# Patient Record
Sex: FEMALE | Race: BLACK OR AFRICAN AMERICAN | NOT HISPANIC OR LATINO | Employment: UNEMPLOYED | URBAN - NONMETROPOLITAN AREA
[De-identification: names, ages, dates, MRNs, and addresses within clinical notes are randomized per-mention and may not be internally consistent; named-entity substitution may affect disease eponyms.]

---

## 2018-01-04 ENCOUNTER — HOSPITAL ENCOUNTER (EMERGENCY)
Facility: HOSPITAL | Age: 28
Discharge: HOME | End: 2018-01-04
Attending: EMERGENCY MEDICINE | Admitting: EMERGENCY MEDICINE

## 2018-01-04 ENCOUNTER — APPOINTMENT (OUTPATIENT)
Dept: GENERAL RADIOLOGY | Facility: HOSPITAL | Age: 28
End: 2018-01-04

## 2018-01-04 VITALS
HEART RATE: 78 BPM | DIASTOLIC BLOOD PRESSURE: 64 MMHG | HEIGHT: 66 IN | RESPIRATION RATE: 18 BRPM | TEMPERATURE: 97 F | WEIGHT: 150 LBS | OXYGEN SATURATION: 98 % | SYSTOLIC BLOOD PRESSURE: 97 MMHG | BODY MASS INDEX: 24.11 KG/M2

## 2018-01-04 DIAGNOSIS — F41.0 ANXIETY ATTACK: Primary | ICD-10-CM

## 2018-01-04 LAB
ALBUMIN SERPL-MCNC: 4.7 G/DL (ref 3.4–4.8)
ALBUMIN/GLOB SERPL: 1.3 G/DL (ref 1.1–1.8)
ALP SERPL-CCNC: 84 U/L (ref 38–126)
ALT SERPL W P-5'-P-CCNC: 35 U/L (ref 9–52)
AMPHET+METHAMPHET UR QL: NEGATIVE
ANION GAP SERPL CALCULATED.3IONS-SCNC: 14 MMOL/L (ref 5–15)
AST SERPL-CCNC: 31 U/L (ref 14–36)
B-HCG UR QL: NEGATIVE
BARBITURATES UR QL SCN: NEGATIVE
BASOPHILS # BLD AUTO: 0.03 10*3/MM3 (ref 0–0.2)
BASOPHILS NFR BLD AUTO: 0.4 % (ref 0–2)
BENZODIAZ UR QL SCN: NEGATIVE
BILIRUB SERPL-MCNC: 0.7 MG/DL (ref 0.2–1.3)
BILIRUB UR QL STRIP: NEGATIVE
BUN BLD-MCNC: 9 MG/DL (ref 7–21)
BUN/CREAT SERPL: 16.1 (ref 7–25)
CALCIUM SPEC-SCNC: 9 MG/DL (ref 8.4–10.2)
CANNABINOIDS SERPL QL: NEGATIVE
CHLORIDE SERPL-SCNC: 99 MMOL/L (ref 95–110)
CLARITY UR: CLEAR
CO2 SERPL-SCNC: 24 MMOL/L (ref 22–31)
COCAINE UR QL: NEGATIVE
COLOR UR: YELLOW
CREAT BLD-MCNC: 0.56 MG/DL (ref 0.5–1)
D-DIMER, QUANTITATIVE (MAD,POW, STR): 315 NG/ML (FEU) (ref 0–470)
DEPRECATED RDW RBC AUTO: 38.3 FL (ref 36.4–46.3)
EOSINOPHIL # BLD AUTO: 0.2 10*3/MM3 (ref 0–0.7)
EOSINOPHIL NFR BLD AUTO: 2.8 % (ref 0–7)
ERYTHROCYTE [DISTWIDTH] IN BLOOD BY AUTOMATED COUNT: 13.1 % (ref 11.5–14.5)
GFR SERPL CREATININE-BSD FRML MDRD: 156 ML/MIN/1.73 (ref 71–165)
GLOBULIN UR ELPH-MCNC: 3.6 GM/DL (ref 2.3–3.5)
GLUCOSE BLD-MCNC: 91 MG/DL (ref 60–100)
GLUCOSE UR STRIP-MCNC: NEGATIVE MG/DL
HCT VFR BLD AUTO: 37.9 % (ref 35–45)
HGB BLD-MCNC: 12.9 G/DL (ref 12–15.5)
HGB UR QL STRIP.AUTO: NEGATIVE
IMM GRANULOCYTES # BLD: 0.01 10*3/MM3 (ref 0–0.02)
IMM GRANULOCYTES NFR BLD: 0.1 % (ref 0–0.5)
KETONES UR QL STRIP: NEGATIVE
LEUKOCYTE ESTERASE UR QL STRIP.AUTO: NEGATIVE
LYMPHOCYTES # BLD AUTO: 2.68 10*3/MM3 (ref 0.6–4.2)
LYMPHOCYTES NFR BLD AUTO: 37.7 % (ref 10–50)
MCH RBC QN AUTO: 27.2 PG (ref 26.5–34)
MCHC RBC AUTO-ENTMCNC: 34 G/DL (ref 31.4–36)
MCV RBC AUTO: 79.8 FL (ref 80–98)
METHADONE UR QL SCN: NEGATIVE
MONOCYTES # BLD AUTO: 0.34 10*3/MM3 (ref 0–0.9)
MONOCYTES NFR BLD AUTO: 4.8 % (ref 0–12)
NEUTROPHILS # BLD AUTO: 3.84 10*3/MM3 (ref 2–8.6)
NEUTROPHILS NFR BLD AUTO: 54.2 % (ref 37–80)
NITRITE UR QL STRIP: NEGATIVE
NRBC BLD MANUAL-RTO: 0 /100 WBC (ref 0–0)
OPIATES UR QL: NEGATIVE
OXYCODONE UR QL SCN: NEGATIVE
PH UR STRIP.AUTO: 7 [PH] (ref 5–9)
PLATELET # BLD AUTO: 426 10*3/MM3 (ref 150–450)
PMV BLD AUTO: 8.1 FL (ref 8–12)
POTASSIUM BLD-SCNC: 3.4 MMOL/L (ref 3.5–5.1)
PROT SERPL-MCNC: 8.3 G/DL (ref 6.3–8.6)
PROT UR QL STRIP: NEGATIVE
RBC # BLD AUTO: 4.75 10*6/MM3 (ref 3.77–5.16)
SODIUM BLD-SCNC: 137 MMOL/L (ref 137–145)
SP GR UR STRIP: 1.01 (ref 1–1.03)
TROPONIN I SERPL-MCNC: <0.012 NG/ML
TSH SERPL DL<=0.05 MIU/L-ACNC: 1.69 MIU/ML (ref 0.46–4.68)
UROBILINOGEN UR QL STRIP: NORMAL
WBC NRBC COR # BLD: 7.1 10*3/MM3 (ref 3.2–9.8)

## 2018-01-04 PROCEDURE — 80050 GENERAL HEALTH PANEL: CPT | Performed by: PHYSICIAN ASSISTANT

## 2018-01-04 PROCEDURE — 80307 DRUG TEST PRSMV CHEM ANLYZR: CPT | Performed by: PHYSICIAN ASSISTANT

## 2018-01-04 PROCEDURE — 93010 ELECTROCARDIOGRAM REPORT: CPT | Performed by: INTERNAL MEDICINE

## 2018-01-04 PROCEDURE — 84484 ASSAY OF TROPONIN QUANT: CPT | Performed by: PHYSICIAN ASSISTANT

## 2018-01-04 PROCEDURE — 93005 ELECTROCARDIOGRAM TRACING: CPT

## 2018-01-04 PROCEDURE — 85379 FIBRIN DEGRADATION QUANT: CPT | Performed by: PHYSICIAN ASSISTANT

## 2018-01-04 PROCEDURE — 96360 HYDRATION IV INFUSION INIT: CPT

## 2018-01-04 PROCEDURE — 99283 EMERGENCY DEPT VISIT LOW MDM: CPT

## 2018-01-04 PROCEDURE — 81025 URINE PREGNANCY TEST: CPT | Performed by: PHYSICIAN ASSISTANT

## 2018-01-04 PROCEDURE — 96361 HYDRATE IV INFUSION ADD-ON: CPT

## 2018-01-04 PROCEDURE — 93005 ELECTROCARDIOGRAM TRACING: CPT | Performed by: PHYSICIAN ASSISTANT

## 2018-01-04 PROCEDURE — 81003 URINALYSIS AUTO W/O SCOPE: CPT | Performed by: PHYSICIAN ASSISTANT

## 2018-01-04 PROCEDURE — 71045 X-RAY EXAM CHEST 1 VIEW: CPT

## 2018-01-04 RX ORDER — SODIUM CHLORIDE 0.9 % (FLUSH) 0.9 %
10 SYRINGE (ML) INJECTION AS NEEDED
Status: DISCONTINUED | OUTPATIENT
Start: 2018-01-04 | End: 2018-01-04 | Stop reason: HOSPADM

## 2018-01-04 RX ORDER — ASPIRIN 81 MG/1
324 TABLET, CHEWABLE ORAL ONCE
Status: COMPLETED | OUTPATIENT
Start: 2018-01-04 | End: 2018-01-04

## 2018-01-04 RX ORDER — BUSPIRONE HYDROCHLORIDE 15 MG/1
15 TABLET ORAL 3 TIMES DAILY
Qty: 90 TABLET | Refills: 0 | Status: SHIPPED | OUTPATIENT
Start: 2018-01-04 | End: 2018-02-03

## 2018-01-04 RX ORDER — ALPRAZOLAM 0.5 MG/1
0.5 TABLET ORAL ONCE
Status: COMPLETED | OUTPATIENT
Start: 2018-01-04 | End: 2018-01-04

## 2018-01-04 RX ORDER — SODIUM CHLORIDE 9 MG/ML
1000 INJECTION, SOLUTION INTRAVENOUS ONCE
Status: COMPLETED | OUTPATIENT
Start: 2018-01-04 | End: 2018-01-04

## 2018-01-04 RX ADMIN — SODIUM CHLORIDE 1000 ML: 900 INJECTION, SOLUTION INTRAVENOUS at 12:08

## 2018-01-04 RX ADMIN — ALPRAZOLAM 0.5 MG: 0.5 TABLET ORAL at 12:07

## 2018-01-04 RX ADMIN — Medication 10 ML: at 12:01

## 2018-01-04 RX ADMIN — ASPIRIN 81 MG 324 MG: 81 TABLET ORAL at 12:07

## 2018-01-04 NOTE — ED PROVIDER NOTES
Subjective   HPI Comments: Patient states that she had an episode when she felt overwhelmed with chest pain and shortness of breath. Says that her mouth got really dry and her hands were tingly. She started pacing outside in the yard until someone got to her house. Family member states that she had another similar episode in the waiting room here in the ED. Denies taking any medications, smoking, drug use, or alcohol use. Does not take any medicines everyday.     Patient is a 27 y.o. female presenting with anxiety.   History provided by:  Patient   used: No    Anxiety   Presents for initial visit. Onset was in the past 7 days. The problem has been unchanged. Symptoms include chest pain, dry mouth, excessive worry, irritability, nausea, nervous/anxious behavior, panic and shortness of breath. Patient reports no compulsions, confusion, decreased concentration, depressed mood, dizziness, feeling of choking, hyperventilation, impotence, insomnia, malaise, muscle tension, obsessions, palpitations, restlessness or suicidal ideas. Symptoms occur occasionally. The most recent episode lasted 2 minutes. The severity of symptoms is mild. Nothing aggravates the symptoms. The patient sleeps 6 hours per night. The quality of sleep is fair. Nighttime awakenings: occasional.     There are no known risk factors. There is no history of anemia, anxiety/panic attacks, arrhythmia, asthma, bipolar disorder, CAD, CHF, chronic lung disease, depression, fibromyalgia, hyperthyroidism or suicide attempts. Past treatments include nothing.       Review of Systems   Constitutional: Positive for irritability. Negative for activity change, appetite change, chills, diaphoresis, fatigue, fever and unexpected weight change.   HENT: Negative for congestion, dental problem, drooling, ear discharge, ear pain, facial swelling, hearing loss, mouth sores, nosebleeds, postnasal drip, rhinorrhea, sinus pain, sinus pressure, sneezing, sore  throat, tinnitus, trouble swallowing and voice change.    Eyes: Negative.    Respiratory: Positive for shortness of breath. Negative for apnea, cough, choking, chest tightness, wheezing and stridor.    Cardiovascular: Positive for chest pain. Negative for palpitations and leg swelling.   Gastrointestinal: Positive for nausea. Negative for abdominal distention, abdominal pain, anal bleeding, blood in stool, constipation and diarrhea.   Endocrine: Negative.    Genitourinary: Negative.  Negative for impotence.   Musculoskeletal: Negative.    Skin: Negative.    Allergic/Immunologic: Negative.    Neurological: Negative for dizziness.   Hematological: Negative.    Psychiatric/Behavioral: Negative for agitation, behavioral problems, confusion, decreased concentration, dysphoric mood, hallucinations, self-injury, sleep disturbance and suicidal ideas. The patient is nervous/anxious. The patient does not have insomnia and is not hyperactive.        History reviewed. No pertinent past medical history.    No Known Allergies    History reviewed. No pertinent surgical history.    History reviewed. No pertinent family history.    Social History     Social History   • Marital status:      Spouse name: N/A   • Number of children: N/A   • Years of education: N/A     Social History Main Topics   • Smoking status: Never Smoker   • Smokeless tobacco: None   • Alcohol use No   • Drug use: No   • Sexual activity: Defer     Other Topics Concern   • None     Social History Narrative   • None           Objective   Physical Exam   Constitutional: She is oriented to person, place, and time. She appears well-developed and well-nourished. No distress.   HENT:   Head: Normocephalic and atraumatic.   Eyes: Conjunctivae and EOM are normal. Pupils are equal, round, and reactive to light. Right eye exhibits no discharge. Left eye exhibits no discharge. No scleral icterus.   Neck: Normal range of motion. Neck supple. No JVD present. No tracheal  deviation present. No thyromegaly present.   Cardiovascular: Normal rate, regular rhythm, normal heart sounds and intact distal pulses.  Exam reveals no gallop and no friction rub.    No murmur heard.  Pulmonary/Chest: Effort normal and breath sounds normal. No stridor. No respiratory distress. She has no wheezes. She has no rales. She exhibits no tenderness.   Abdominal: Soft. Bowel sounds are normal. She exhibits no distension and no mass. There is no tenderness. There is no rebound and no guarding. No hernia.   Musculoskeletal: Normal range of motion. She exhibits no edema, tenderness or deformity.   Lymphadenopathy:     She has no cervical adenopathy.   Neurological: She is alert and oriented to person, place, and time. She has normal reflexes.   Skin: Skin is warm and dry. No rash noted. She is not diaphoretic. No erythema. No pallor.   Psychiatric: Her speech is normal and behavior is normal. Judgment and thought content normal. Her mood appears anxious. Her affect is not angry, not blunt, not labile and not inappropriate. She is not actively hallucinating. Cognition and memory are normal. She does not exhibit a depressed mood. She is attentive.   Nursing note and vitals reviewed.      ECG 12 Lead    Date/Time: 1/4/2018 4:39 PM  Performed by: BALBIR VARGAS  Authorized by: BALBIR VARGAS   Interpreted by physician  Comparison: compared with previous ECG   Rhythm: sinus rhythm  Rate: normal  Clinical impression: normal ECG               ED Course  ED Course      Labs Reviewed   COMPREHENSIVE METABOLIC PANEL - Abnormal; Notable for the following:        Result Value    Potassium 3.4 (*)     Globulin 3.6 (*)     All other components within normal limits   CBC WITH AUTO DIFFERENTIAL - Abnormal; Notable for the following:     MCV 79.8 (*)     All other components within normal limits   D-DIMER, QUANTITATIVE - Normal    Narrative:     Dimer values <500 ng/ml FEU are FDA approved as aid in diagnosis of deep venous  thrombosis and pulmonary embolism.  This test should not be used in an exclusion strategy with pretest probability alone.    A recent guideline regarding diagnosis for pulmonary thomboembolism recommends an adjusted exclusion criterion of age x 10 ng/ml FEU for patients >50 years of age ([employer] Intern Med 2015; 163: 701-711).   URINALYSIS W/ CULTURE IF INDICATED - Normal    Narrative:     Urine microscopic not indicated.   PREGNANCY, URINE - Normal   URINE DRUG SCREEN - Normal    Narrative:     Negative Thresholds For Drugs Screened in Urine:     Amphetamines          500 ng/ml  Barbiturates          200 ng/ml  Benzodiazepines       200 ng/ml  Cocaine               150 ng/ml  Methadone             300 ng/mL  Opiates               300 ng/mL  Oxycodone             100 ng/mL  THC                   20 ng/mL    The normal value for all drugs tested is negative. This report includes final unconfirmed screening results.  A positive result by this assay can be, at your request, sent to the Reference Lab for confirmation by gas chromatography. Unconfirmed results must not be used for non-medical purposes, such as employment or legal testing. Clinical consideration should be applied to any drug of abuse test result, particularly when unconfirmed results are used.   TROPONIN (IN-HOUSE) - Normal   TSH - Normal   CBC AND DIFFERENTIAL    Narrative:     The following orders were created for panel order CBC & Differential.  Procedure                               Abnormality         Status                     ---------                               -----------         ------                     CBC Auto Differential[[id number]]        Abnormal            Final result                 Please view results for these tests on the individual orders.     Xr Chest 1 View    Result Date: 1/4/2018  Narrative: PORTABLE CHEST HISTORY: Chest pain and shortness of breath. Portable AP upright film of the chest was obtained at 1:24 PM. Abdomen and  pelvis shielded. COMPARISON: None EKG leads. The lungs are clear of an acute process. The heart is not enlarged. The pulmonary vasculature is not increased. No pleural effusion. No pneumothorax. No acute osseous abnormality.     Impression: CONCLUSION: Normal portable chest [id number] Electronically signed by:  Maximiliano Guzman MD  1/4/2018 1:12 PM CST Workstation: "[employer] App"      Patient's symptoms resolved with anxiety medications. States that she feels better and wants to be sent home.           MDM  Number of Diagnoses or Management Options  Anxiety attack:       Final diagnoses:   Anxiety attack            RACHEL Cruz  01/04/18 1640

## 2018-01-04 NOTE — ED NOTES
Pt verbalized understanding of d/c instructions, new medication side effects, and importance of follow up with PCP.  Pt denies pain at this time.     Lisa Uriarte RN  01/04/18 7961

## 2018-05-29 PROCEDURE — 87255 GENET VIRUS ISOLATE HSV: CPT | Performed by: PHYSICIAN ASSISTANT

## 2018-07-06 ENCOUNTER — APPOINTMENT (OUTPATIENT)
Dept: LAB | Facility: HOSPITAL | Age: 28
End: 2018-07-06

## 2018-07-06 ENCOUNTER — INITIAL PRENATAL (OUTPATIENT)
Dept: OBSTETRICS AND GYNECOLOGY | Facility: CLINIC | Age: 28
End: 2018-07-06

## 2018-07-06 VITALS — WEIGHT: 174 LBS | BODY MASS INDEX: 29.87 KG/M2 | DIASTOLIC BLOOD PRESSURE: 78 MMHG | SYSTOLIC BLOOD PRESSURE: 113 MMHG

## 2018-07-06 DIAGNOSIS — O21.9 NAUSEA AND VOMITING DURING PREGNANCY: ICD-10-CM

## 2018-07-06 DIAGNOSIS — Z3A.00 WEEKS OF GESTATION OF PREGNANCY NOT SPECIFIED: ICD-10-CM

## 2018-07-06 DIAGNOSIS — Z34.80 PRENATAL CARE OF MULTIGRAVIDA, ANTEPARTUM: Primary | ICD-10-CM

## 2018-07-06 DIAGNOSIS — Z01.419 ENCOUNTER FOR WELL WOMAN EXAM WITH ROUTINE GYNECOLOGICAL EXAM: ICD-10-CM

## 2018-07-06 DIAGNOSIS — Z32.00 POSSIBLE PREGNANCY, NOT YET CONFIRMED: ICD-10-CM

## 2018-07-06 DIAGNOSIS — Z32.01 PREGNANCY EXAMINATION OR TEST, POSITIVE RESULT: ICD-10-CM

## 2018-07-06 LAB
ABO GROUP BLD: NORMAL
AMPHET+METHAMPHET UR QL: NEGATIVE
B-HCG UR QL: POSITIVE
BARBITURATES UR QL SCN: NEGATIVE
BASOPHILS # BLD AUTO: 0.01 10*3/MM3 (ref 0–0.2)
BASOPHILS NFR BLD AUTO: 0.1 % (ref 0–2)
BENZODIAZ UR QL SCN: NEGATIVE
BILIRUB UR QL STRIP: NEGATIVE
BLD GP AB SCN SERPL QL: NEGATIVE
CANNABINOIDS SERPL QL: NEGATIVE
CLARITY UR: ABNORMAL
COCAINE UR QL: NEGATIVE
COLOR UR: YELLOW
DEPRECATED RDW RBC AUTO: 39.6 FL (ref 36.4–46.3)
EOSINOPHIL # BLD AUTO: 0.25 10*3/MM3 (ref 0–0.7)
EOSINOPHIL NFR BLD AUTO: 3.1 % (ref 0–7)
ERYTHROCYTE [DISTWIDTH] IN BLOOD BY AUTOMATED COUNT: 14 % (ref 11.5–14.5)
GLUCOSE UR STRIP-MCNC: NEGATIVE MG/DL
HBV SURFACE AG SERPL QL IA: NEGATIVE
HCT VFR BLD AUTO: 33.9 % (ref 35–45)
HCV AB SER DONR QL: NEGATIVE
HGB BLD-MCNC: 11.8 G/DL (ref 12–15.5)
HGB UR QL STRIP.AUTO: NEGATIVE
HIV1+2 AB SER QL: NEGATIVE
IMM GRANULOCYTES # BLD: 0.02 10*3/MM3 (ref 0–0.02)
IMM GRANULOCYTES NFR BLD: 0.2 % (ref 0–0.5)
INTERNAL NEGATIVE CONTROL: NEGATIVE
INTERNAL POSITIVE CONTROL: POSITIVE
KETONES UR QL STRIP: NEGATIVE
LEUKOCYTE ESTERASE UR QL STRIP.AUTO: NEGATIVE
LYMPHOCYTES # BLD AUTO: 1.9 10*3/MM3 (ref 0.6–4.2)
LYMPHOCYTES NFR BLD AUTO: 23.7 % (ref 10–50)
Lab: ABNORMAL
Lab: NORMAL
MCH RBC QN AUTO: 27.4 PG (ref 26.5–34)
MCHC RBC AUTO-ENTMCNC: 34.8 G/DL (ref 31.4–36)
MCV RBC AUTO: 78.8 FL (ref 80–98)
METHADONE UR QL SCN: NEGATIVE
MONOCYTES # BLD AUTO: 0.45 10*3/MM3 (ref 0–0.9)
MONOCYTES NFR BLD AUTO: 5.6 % (ref 0–12)
NEUTROPHILS # BLD AUTO: 5.38 10*3/MM3 (ref 2–8.6)
NEUTROPHILS NFR BLD AUTO: 67.3 % (ref 37–80)
NITRITE UR QL STRIP: NEGATIVE
OPIATES UR QL: NEGATIVE
OXYCODONE UR QL SCN: NEGATIVE
PH UR STRIP.AUTO: 7 [PH] (ref 5–9)
PLATELET # BLD AUTO: 393 10*3/MM3 (ref 150–450)
PMV BLD AUTO: 8.1 FL (ref 8–12)
PROT UR QL STRIP: NEGATIVE
RBC # BLD AUTO: 4.3 10*6/MM3 (ref 3.77–5.16)
RH BLD: POSITIVE
RUBV IGG SER QL: ABNORMAL
RUBV IGG SER-ACNC: 0 IU/ML (ref 0–9.9)
SP GR UR STRIP: 1.02 (ref 1–1.03)
UROBILINOGEN UR QL STRIP: ABNORMAL
WBC NRBC COR # BLD: 8.01 10*3/MM3 (ref 3.2–9.8)

## 2018-07-06 PROCEDURE — 87491 CHLMYD TRACH DNA AMP PROBE: CPT | Performed by: ADVANCED PRACTICE MIDWIFE

## 2018-07-06 PROCEDURE — 80307 DRUG TEST PRSMV CHEM ANLYZR: CPT | Performed by: ADVANCED PRACTICE MIDWIFE

## 2018-07-06 PROCEDURE — 86803 HEPATITIS C AB TEST: CPT | Performed by: ADVANCED PRACTICE MIDWIFE

## 2018-07-06 PROCEDURE — 87340 HEPATITIS B SURFACE AG IA: CPT | Performed by: ADVANCED PRACTICE MIDWIFE

## 2018-07-06 PROCEDURE — 87661 TRICHOMONAS VAGINALIS AMPLIF: CPT | Performed by: ADVANCED PRACTICE MIDWIFE

## 2018-07-06 PROCEDURE — 86901 BLOOD TYPING SEROLOGIC RH(D): CPT | Performed by: ADVANCED PRACTICE MIDWIFE

## 2018-07-06 PROCEDURE — 86762 RUBELLA ANTIBODY: CPT | Performed by: ADVANCED PRACTICE MIDWIFE

## 2018-07-06 PROCEDURE — 36415 COLL VENOUS BLD VENIPUNCTURE: CPT | Performed by: ADVANCED PRACTICE MIDWIFE

## 2018-07-06 PROCEDURE — G0123 SCREEN CERV/VAG THIN LAYER: HCPCS | Performed by: ADVANCED PRACTICE MIDWIFE

## 2018-07-06 PROCEDURE — 87086 URINE CULTURE/COLONY COUNT: CPT | Performed by: ADVANCED PRACTICE MIDWIFE

## 2018-07-06 PROCEDURE — 85025 COMPLETE CBC W/AUTO DIFF WBC: CPT | Performed by: ADVANCED PRACTICE MIDWIFE

## 2018-07-06 PROCEDURE — 81003 URINALYSIS AUTO W/O SCOPE: CPT | Performed by: ADVANCED PRACTICE MIDWIFE

## 2018-07-06 PROCEDURE — 81025 URINE PREGNANCY TEST: CPT | Performed by: ADVANCED PRACTICE MIDWIFE

## 2018-07-06 PROCEDURE — 87591 N.GONORRHOEAE DNA AMP PROB: CPT | Performed by: ADVANCED PRACTICE MIDWIFE

## 2018-07-06 PROCEDURE — 99213 OFFICE O/P EST LOW 20 MIN: CPT | Performed by: ADVANCED PRACTICE MIDWIFE

## 2018-07-06 PROCEDURE — G0432 EIA HIV-1/HIV-2 SCREEN: HCPCS | Performed by: ADVANCED PRACTICE MIDWIFE

## 2018-07-06 PROCEDURE — 86900 BLOOD TYPING SEROLOGIC ABO: CPT | Performed by: ADVANCED PRACTICE MIDWIFE

## 2018-07-06 PROCEDURE — 86850 RBC ANTIBODY SCREEN: CPT | Performed by: ADVANCED PRACTICE MIDWIFE

## 2018-07-06 RX ORDER — ONDANSETRON 4 MG/1
4 TABLET, FILM COATED ORAL DAILY PRN
Qty: 30 TABLET | Refills: 1 | Status: SHIPPED | OUTPATIENT
Start: 2018-07-06 | End: 2018-12-30 | Stop reason: HOSPADM

## 2018-07-06 RX ORDER — PROMETHAZINE HYDROCHLORIDE 12.5 MG/1
12.5 TABLET ORAL EVERY 6 HOURS PRN
Qty: 20 TABLET | Refills: 3 | Status: SHIPPED | OUTPATIENT
Start: 2018-07-06 | End: 2018-12-30 | Stop reason: HOSPADM

## 2018-07-07 LAB
BACTERIA SPEC AEROBE CULT: NORMAL
C TRACH RRNA CVX QL NAA+PROBE: NEGATIVE
N GONORRHOEA RRNA SPEC QL NAA+PROBE: NEGATIVE
T VAGINALIS DNA VAG QL PROBE+SIG AMP: NEGATIVE

## 2018-07-08 LAB — RPR SER QL: NORMAL

## 2018-07-10 LAB
LAB AP CASE REPORT: NORMAL
LAB AP CLINICAL INFORMATION: NORMAL
LAB AP GYN ADDITIONAL INFORMATION: NORMAL
LAB AP GYN OTHER FINDINGS: NORMAL
Lab: NORMAL
PATH INTERP SPEC-IMP: NORMAL
STAT OF ADQ CVX/VAG CYTO-IMP: NORMAL

## 2018-07-11 NOTE — PROGRESS NOTES
CC: CHERYL visit, history reviewed, changes noted    ROS:Positive N&V   Negative leaking fluid from the vagina, swelling in her legs, headache, visual changes, low back pain and heartburn    Objective: See prenatal physical, new OB labs                    ASA 81 mg PO daily    Educated on:Spent 30 minutes out of 45 minutes face to face counseling on nutrition, activity, diet, safety, prenatal care, medications approved in pregnancy, pregnancy discomforts, and testing.       A/Plan: f/u in 1 week/s for dating US  Carlyn was seen today for initial prenatal visit.    Diagnoses and all orders for this visit:    Prenatal care of multigravida, antepartum  -     Urine Drug Screen - Urine, Clean Catch  -     Urine Culture - Urine, Urine, Clean Catch  -     Urinalysis With Microscopic If Indicated (No Culture) - Urine, Clean Catch  -     OB Panel With HIV  -     Chlamydia trachomatis, Neisseria gonorrhoeae, Trichomonas vaginalis, PCR - Urine, Urine, Clean Catch  -     RPR  -     CBC Auto Differential  -     Hepatitis B Surface Antigen  -     Rubella Antibody, IgG  -     OB Panel Type & Screen  -     HIV-1 & HIV-2 Antibodies  -     Hepatitis C Antibody  -     PREVIOUS HISTORY; Future  -     PREVIOUS HISTORY    Pregnancy examination or test, positive result  -     US Ob Transvaginal; Future    Encounter for well woman exam with routine gynecological exam  -     Liquid-based Pap Smear, Diagnostic    Possible pregnancy, not yet confirmed  -     POC Pregnancy, Urine    Weeks of gestation of pregnancy not specified    Nausea and vomiting during pregnancy    Other orders  -     ondansetron (ZOFRAN) 4 MG tablet; Take 1 tablet by mouth Daily As Needed for Nausea or Vomiting.  -     promethazine (PHENERGAN) 12.5 MG tablet; Take 1 tablet by mouth Every 6 (Six) Hours As Needed for Nausea or Vomiting.

## 2018-07-16 ENCOUNTER — ROUTINE PRENATAL (OUTPATIENT)
Dept: OBSTETRICS AND GYNECOLOGY | Facility: CLINIC | Age: 28
End: 2018-07-16

## 2018-07-16 VITALS — WEIGHT: 174 LBS | BODY MASS INDEX: 29.87 KG/M2 | DIASTOLIC BLOOD PRESSURE: 69 MMHG | SYSTOLIC BLOOD PRESSURE: 110 MMHG

## 2018-07-16 DIAGNOSIS — Z3A.15 15 WEEKS GESTATION OF PREGNANCY: Primary | ICD-10-CM

## 2018-07-16 DIAGNOSIS — Z34.82 ENCOUNTER FOR SUPERVISION OF NORMAL PREGNANCY IN MULTIGRAVIDA IN SECOND TRIMESTER: ICD-10-CM

## 2018-07-16 DIAGNOSIS — O09.299 HISTORY OF SPONTANEOUS ABORTION, CURRENTLY PREGNANT: ICD-10-CM

## 2018-07-16 PROCEDURE — 99212 OFFICE O/P EST SF 10 MIN: CPT | Performed by: ADVANCED PRACTICE MIDWIFE

## 2018-08-13 ENCOUNTER — ROUTINE PRENATAL (OUTPATIENT)
Dept: OBSTETRICS AND GYNECOLOGY | Facility: CLINIC | Age: 28
End: 2018-08-13

## 2018-08-13 VITALS — SYSTOLIC BLOOD PRESSURE: 106 MMHG | DIASTOLIC BLOOD PRESSURE: 74 MMHG | WEIGHT: 180 LBS | BODY MASS INDEX: 30.9 KG/M2

## 2018-08-13 DIAGNOSIS — O99.519 ASTHMA AFFECTING PREGNANCY, ANTEPARTUM: ICD-10-CM

## 2018-08-13 DIAGNOSIS — Z3A.19 19 WEEKS GESTATION OF PREGNANCY: Primary | ICD-10-CM

## 2018-08-13 DIAGNOSIS — J45.909 ASTHMA AFFECTING PREGNANCY, ANTEPARTUM: ICD-10-CM

## 2018-08-13 DIAGNOSIS — O26.899 HEADACHE IN PREGNANCY, ANTEPARTUM: ICD-10-CM

## 2018-08-13 DIAGNOSIS — R51.9 HEADACHE IN PREGNANCY, ANTEPARTUM: ICD-10-CM

## 2018-08-13 PROCEDURE — 99212 OFFICE O/P EST SF 10 MIN: CPT | Performed by: ADVANCED PRACTICE MIDWIFE

## 2018-08-16 NOTE — PROGRESS NOTES
CC: CHERYL visit, history reviewed, changes noted     ROS:Positive headaches   Negative leaking fluid from the vagina, swelling in her legs, visual changes, low back pain and heartburn      Educated on:Take Tylenol for HA, preeclampsia warning signs given, US results    A/Plan: f/u in 4 week/s   Carlyn was seen today for routine prenatal visit.    Diagnoses and all orders for this visit:    19 weeks gestation of pregnancy    Headache in pregnancy, antepartum    Asthma affecting pregnancy, antepartum

## 2018-08-29 NOTE — PROGRESS NOTES
CC: CHERYL visit, history reviewed, no changes noted    ROS:Positive No complaints   Negative leaking fluid from the vagina, swelling in her legs, headache, visual changes, low back pain and heartburn      Educated on:US next visit    A/Plan: f/u in 4 week/s   Carlyn was seen today for routine prenatal visit.    Diagnoses and all orders for this visit:    15 weeks gestation of pregnancy  -     Cancel: US Ob 14 + Weeks Single or First Gestation; Future    Encounter for supervision of normal pregnancy in multigravida in second trimester    History of spontaneous , currently pregnant

## 2018-09-17 ENCOUNTER — ROUTINE PRENATAL (OUTPATIENT)
Dept: OBSTETRICS AND GYNECOLOGY | Facility: CLINIC | Age: 28
End: 2018-09-17

## 2018-09-17 VITALS — WEIGHT: 193 LBS | SYSTOLIC BLOOD PRESSURE: 107 MMHG | DIASTOLIC BLOOD PRESSURE: 68 MMHG | BODY MASS INDEX: 33.13 KG/M2

## 2018-09-17 DIAGNOSIS — J45.909 ASTHMA AFFECTING PREGNANCY, ANTEPARTUM: ICD-10-CM

## 2018-09-17 DIAGNOSIS — Z3A.24 24 WEEKS GESTATION OF PREGNANCY: Primary | ICD-10-CM

## 2018-09-17 DIAGNOSIS — Z34.82 ENCOUNTER FOR SUPERVISION OF NORMAL PREGNANCY IN MULTIGRAVIDA IN SECOND TRIMESTER: ICD-10-CM

## 2018-09-17 DIAGNOSIS — O99.519 ASTHMA AFFECTING PREGNANCY, ANTEPARTUM: ICD-10-CM

## 2018-09-17 PROCEDURE — 99212 OFFICE O/P EST SF 10 MIN: CPT | Performed by: ADVANCED PRACTICE MIDWIFE

## 2018-09-19 NOTE — PROGRESS NOTES
CC: CHERYL visit, history reviewed, no changes noted    ROS:Positive No complaints   Negative leaking fluid from the vagina, swelling in her legs, headache, visual changes, low back pain and heartburn    Declines 1 hour GTT at next visit. Will monitor BS with Accu checks & bring diary to next visit    Educated on: VB, PTL, how to check BS    A/Plan: f/u in 4 week/s   Carlyn was seen today for routine prenatal visit.    Diagnoses and all orders for this visit:    24 weeks gestation of pregnancy    Asthma affecting pregnancy, antepartum    Encounter for supervision of normal pregnancy in multigravida in second trimester

## 2018-10-15 ENCOUNTER — ROUTINE PRENATAL (OUTPATIENT)
Dept: OBSTETRICS AND GYNECOLOGY | Facility: CLINIC | Age: 28
End: 2018-10-15

## 2018-10-15 ENCOUNTER — LAB (OUTPATIENT)
Dept: LAB | Facility: HOSPITAL | Age: 28
End: 2018-10-15

## 2018-10-15 VITALS — BODY MASS INDEX: 33.81 KG/M2 | DIASTOLIC BLOOD PRESSURE: 70 MMHG | SYSTOLIC BLOOD PRESSURE: 112 MMHG | WEIGHT: 197 LBS

## 2018-10-15 DIAGNOSIS — F41.9 ANXIETY IN PREGNANCY IN THIRD TRIMESTER, ANTEPARTUM: ICD-10-CM

## 2018-10-15 DIAGNOSIS — O99.343 ANXIETY IN PREGNANCY IN THIRD TRIMESTER, ANTEPARTUM: ICD-10-CM

## 2018-10-15 DIAGNOSIS — O99.513 ASTHMA AFFECTING PREGNANCY IN THIRD TRIMESTER: ICD-10-CM

## 2018-10-15 DIAGNOSIS — Z3A.28 28 WEEKS GESTATION OF PREGNANCY: ICD-10-CM

## 2018-10-15 DIAGNOSIS — Z3A.28 28 WEEKS GESTATION OF PREGNANCY: Primary | ICD-10-CM

## 2018-10-15 DIAGNOSIS — J45.909 ASTHMA AFFECTING PREGNANCY IN THIRD TRIMESTER: ICD-10-CM

## 2018-10-15 LAB
ANISOCYTOSIS BLD QL: NORMAL
BASOPHILS # BLD AUTO: 0.01 10*3/MM3 (ref 0–0.2)
BASOPHILS NFR BLD AUTO: 0.1 % (ref 0–2)
DEPRECATED RDW RBC AUTO: 36.2 FL (ref 36.4–46.3)
EOSINOPHIL # BLD AUTO: 0.21 10*3/MM3 (ref 0–0.7)
EOSINOPHIL NFR BLD AUTO: 2.2 % (ref 0–7)
ERYTHROCYTE [DISTWIDTH] IN BLOOD BY AUTOMATED COUNT: 13.6 % (ref 11.5–14.5)
HCT VFR BLD AUTO: 28.4 % (ref 35–45)
HGB BLD-MCNC: 9.3 G/DL (ref 12–15.5)
IMM GRANULOCYTES # BLD: 0.04 10*3/MM3 (ref 0–0.02)
IMM GRANULOCYTES NFR BLD: 0.4 % (ref 0–0.5)
LYMPHOCYTES # BLD AUTO: 1.69 10*3/MM3 (ref 0.6–4.2)
LYMPHOCYTES NFR BLD AUTO: 18 % (ref 10–50)
MCH RBC QN AUTO: 24.1 PG (ref 26.5–34)
MCHC RBC AUTO-ENTMCNC: 32.7 G/DL (ref 31.4–36)
MCV RBC AUTO: 73.6 FL (ref 80–98)
MONOCYTES # BLD AUTO: 0.6 10*3/MM3 (ref 0–0.9)
MONOCYTES NFR BLD AUTO: 6.4 % (ref 0–12)
NEUTROPHILS # BLD AUTO: 6.86 10*3/MM3 (ref 2–8.6)
NEUTROPHILS NFR BLD AUTO: 72.9 % (ref 37–80)
PLATELET # BLD AUTO: 317 10*3/MM3 (ref 150–450)
PMV BLD AUTO: 8.3 FL (ref 8–12)
RBC # BLD AUTO: 3.86 10*6/MM3 (ref 3.77–5.16)
SMALL PLATELETS BLD QL SMEAR: ADEQUATE
WBC MORPH BLD: NORMAL
WBC NRBC COR # BLD: 9.41 10*3/MM3 (ref 3.2–9.8)

## 2018-10-15 PROCEDURE — 85025 COMPLETE CBC W/AUTO DIFF WBC: CPT

## 2018-10-15 PROCEDURE — 85007 BL SMEAR W/DIFF WBC COUNT: CPT

## 2018-10-15 PROCEDURE — 99212 OFFICE O/P EST SF 10 MIN: CPT | Performed by: ADVANCED PRACTICE MIDWIFE

## 2018-10-15 PROCEDURE — 36415 COLL VENOUS BLD VENIPUNCTURE: CPT

## 2018-10-15 RX ORDER — HYDROXYZINE PAMOATE 25 MG/1
25 CAPSULE ORAL 3 TIMES DAILY PRN
Qty: 60 CAPSULE | Refills: 1 | Status: SHIPPED | OUTPATIENT
Start: 2018-10-15 | End: 2018-12-30 | Stop reason: HOSPADM

## 2018-10-15 RX ORDER — FERROUS SULFATE TAB EC 324 MG (65 MG FE EQUIVALENT) 324 (65 FE) MG
324 TABLET DELAYED RESPONSE ORAL
Qty: 90 TABLET | Refills: 10 | Status: SHIPPED | OUTPATIENT
Start: 2018-10-15 | End: 2019-12-16

## 2018-10-15 NOTE — PROGRESS NOTES
CC: CHERYL visit, history reviewed, changes noted    ROS:Positive having anxiety during the night   Negative leaking fluid from the vagina, swelling in her legs, headache, visual changes, low back pain and heartburn    Declined 1 hour GTT, brought in BS log from Oct 1- Oct 14. BS WNL    Educated on:FKC, VB, PTL    A/Plan: f/u in 2 week/s   Carlyn was seen today for routine prenatal visit.    Diagnoses and all orders for this visit:    28 weeks gestation of pregnancy  -     CBC & Differential; Future    Asthma affecting pregnancy in third trimester    Anxiety in pregnancy in third trimester, antepartum    Other orders  -     hydrOXYzine (VISTARIL) 25 MG capsule; Take 1 capsule by mouth 3 (Three) Times a Day As Needed for Itching.

## 2018-11-06 ENCOUNTER — ROUTINE PRENATAL (OUTPATIENT)
Dept: OBSTETRICS AND GYNECOLOGY | Facility: CLINIC | Age: 28
End: 2018-11-06

## 2018-11-06 VITALS — SYSTOLIC BLOOD PRESSURE: 110 MMHG | WEIGHT: 202 LBS | DIASTOLIC BLOOD PRESSURE: 86 MMHG | BODY MASS INDEX: 34.67 KG/M2

## 2018-11-06 DIAGNOSIS — O99.343 ANXIETY IN PREGNANCY IN THIRD TRIMESTER, ANTEPARTUM: ICD-10-CM

## 2018-11-06 DIAGNOSIS — Z3A.31 31 WEEKS GESTATION OF PREGNANCY: Primary | ICD-10-CM

## 2018-11-06 DIAGNOSIS — J45.909 ASTHMA AFFECTING PREGNANCY IN THIRD TRIMESTER: ICD-10-CM

## 2018-11-06 DIAGNOSIS — O99.513 ASTHMA AFFECTING PREGNANCY IN THIRD TRIMESTER: ICD-10-CM

## 2018-11-06 DIAGNOSIS — O99.013 ANEMIA DURING PREGNANCY IN THIRD TRIMESTER: ICD-10-CM

## 2018-11-06 DIAGNOSIS — F41.9 ANXIETY IN PREGNANCY IN THIRD TRIMESTER, ANTEPARTUM: ICD-10-CM

## 2018-11-06 PROCEDURE — 99212 OFFICE O/P EST SF 10 MIN: CPT | Performed by: ADVANCED PRACTICE MIDWIFE

## 2018-11-10 NOTE — PROGRESS NOTES
CC: CHERYL visit, history reviewed, no changes noted    ROS:Positive no complaints   Negative leaking fluid from the vagina, swelling in her legs, headache, visual changes, low back pain and heartburn      Educated on:FKC, VB, PTL, encouraged to take ferrous sulfate    A/Plan: f/u in 2 week/s   Carlyn was seen today for routine prenatal visit.    Diagnoses and all orders for this visit:    31 weeks gestation of pregnancy    Asthma affecting pregnancy in third trimester    Anxiety in pregnancy in third trimester, antepartum    Anemia during pregnancy in third trimester

## 2018-11-20 ENCOUNTER — ROUTINE PRENATAL (OUTPATIENT)
Dept: OBSTETRICS AND GYNECOLOGY | Facility: CLINIC | Age: 28
End: 2018-11-20

## 2018-11-20 VITALS — DIASTOLIC BLOOD PRESSURE: 62 MMHG | BODY MASS INDEX: 35.02 KG/M2 | SYSTOLIC BLOOD PRESSURE: 118 MMHG | WEIGHT: 204 LBS

## 2018-11-20 DIAGNOSIS — O99.013 ANEMIA DURING PREGNANCY IN THIRD TRIMESTER: ICD-10-CM

## 2018-11-20 DIAGNOSIS — O99.343 ANXIETY IN PREGNANCY IN THIRD TRIMESTER, ANTEPARTUM: ICD-10-CM

## 2018-11-20 DIAGNOSIS — F41.9 ANXIETY IN PREGNANCY IN THIRD TRIMESTER, ANTEPARTUM: ICD-10-CM

## 2018-11-20 DIAGNOSIS — O99.513 ASTHMA AFFECTING PREGNANCY IN THIRD TRIMESTER: ICD-10-CM

## 2018-11-20 DIAGNOSIS — Z3A.33 33 WEEKS GESTATION OF PREGNANCY: Primary | ICD-10-CM

## 2018-11-20 DIAGNOSIS — J45.909 ASTHMA AFFECTING PREGNANCY IN THIRD TRIMESTER: ICD-10-CM

## 2018-11-20 DIAGNOSIS — O09.299 H/O MACROSOMIA IN INFANT IN PRIOR PREGNANCY, CURRENTLY PREGNANT: ICD-10-CM

## 2018-11-20 PROCEDURE — 99213 OFFICE O/P EST LOW 20 MIN: CPT | Performed by: OBSTETRICS & GYNECOLOGY

## 2018-11-20 NOTE — PROGRESS NOTES
Chief Complaint   Patient presents with   • High Risk Gestation     Carlyn Aguila is a 28 y.o. year old .      Prenatal course complicated by anemia of pregnancy on iron, maternal obesity, grand multiparity, and history of macrosomic infant.  She declined 1 hour glucose testing.  Two-hour postprandial broad blood sugars were checked over a two-week period and found to be normal.    2018 she is measuring size greater than dates.  We will check ultrasound for growth and biophysical profile and perform GBS with next visit.    Obstetric History  #: 1, Date: 2008, Sex: Male, Weight: 4564 g (10 lb 1 oz), GA: 41w0d, Delivery: Vaginal, Spontaneous, Apgar1: None, Apgar5: None, Living: Living, Birth Comments: None    #: 2, Date: 10/2010, Sex: Male, Weight: 3430 g (7 lb 9 oz), GA: 38w1d, Delivery: Vaginal, Spontaneous, Apgar1: None, Apgar5: None, Living: Living, Birth Comments: None    #: 3, Date: 2013, Sex: Male, Weight: 3685 g (8 lb 2 oz), GA: 37w0d, Delivery: Vaginal, Spontaneous, Apgar1: None, Apgar5: None, Living: Living, Birth Comments: None    #: 4, Date: 03/27/15, Sex: Male, Weight: 3856 g (8 lb 8 oz), GA: 39w0d, Delivery: Vaginal, Spontaneous, Apgar1: None, Apgar5: None, Living: Living, Birth Comments: IOL     #: 5, Date: 2017, Sex: None, Weight: None, GA: 6w0d, Delivery: None, Apgar1: None, Apgar5: None, Living: None, Birth Comments: None    #: 6, Date: 2017, Sex: None, Weight: None, GA: 6w0d, Delivery: None, Apgar1: None, Apgar5: None, Living: None, Birth Comments: None    #: 7, Date: None, Sex: None, Weight: None, GA: None, Delivery: None, Apgar1: None, Apgar5: None, Living: None, Birth Comments: None      The patient complains of the following: No new complaints    ROS  Headache: No   Visual changes: No   Swelling in legs: No   Nausea: No   Constipation: No   Diarrhea: No   Contractions: No   Leaking fluid: No   Vaginal bleeding: No   Other:      Specific topics discussed at  today's visit: Fetal kick counts and  labor precautions and size greater than dates  Tests done today: none  Tests to be done at the next visit: BPP  GBS Swab  U/S for EFW     Carlyn was seen today for high risk gestation.    Diagnoses and all orders for this visit:    33 weeks gestation of pregnancy    Anemia during pregnancy in third trimester    Asthma affecting pregnancy in third trimester    Anxiety in pregnancy in third trimester, antepartum    Large for dates    H/O macrosomia in infant in prior pregnancy, currently pregnant

## 2018-12-06 ENCOUNTER — ROUTINE PRENATAL (OUTPATIENT)
Dept: OBSTETRICS AND GYNECOLOGY | Facility: CLINIC | Age: 28
End: 2018-12-06

## 2018-12-06 VITALS — BODY MASS INDEX: 35.36 KG/M2 | SYSTOLIC BLOOD PRESSURE: 122 MMHG | WEIGHT: 206 LBS | DIASTOLIC BLOOD PRESSURE: 68 MMHG

## 2018-12-06 DIAGNOSIS — F41.9 ANXIETY IN PREGNANCY IN THIRD TRIMESTER, ANTEPARTUM: ICD-10-CM

## 2018-12-06 DIAGNOSIS — O99.013 ANEMIA DURING PREGNANCY IN THIRD TRIMESTER: ICD-10-CM

## 2018-12-06 DIAGNOSIS — Z3A.35 35 WEEKS GESTATION OF PREGNANCY: Primary | ICD-10-CM

## 2018-12-06 DIAGNOSIS — O09.299 H/O MACROSOMIA IN INFANT IN PRIOR PREGNANCY, CURRENTLY PREGNANT: ICD-10-CM

## 2018-12-06 DIAGNOSIS — O99.513 ASTHMA AFFECTING PREGNANCY IN THIRD TRIMESTER: ICD-10-CM

## 2018-12-06 DIAGNOSIS — O99.343 ANXIETY IN PREGNANCY IN THIRD TRIMESTER, ANTEPARTUM: ICD-10-CM

## 2018-12-06 DIAGNOSIS — J45.909 ASTHMA AFFECTING PREGNANCY IN THIRD TRIMESTER: ICD-10-CM

## 2018-12-06 PROCEDURE — 99213 OFFICE O/P EST LOW 20 MIN: CPT | Performed by: OBSTETRICS & GYNECOLOGY

## 2018-12-06 PROCEDURE — 87653 STREP B DNA AMP PROBE: CPT | Performed by: OBSTETRICS & GYNECOLOGY

## 2018-12-06 NOTE — PROGRESS NOTES
Chief Complaint   Patient presents with   • High Risk Gestation   Carlyn Aguila is a 28 y.o. year old .       Prenatal course complicated by anemia of pregnancy on iron, maternal obesity, grand multiparity, and history of macrosomic infant.  She declined 1 hour glucose testing.  Two-hour postprandial broad blood sugars were checked over a two-week period and found to be normal.     2018 she is measuring size greater than dates.  We will check ultrasound for growth and biophysical profile and perform GBS with next visit.    2018 ultrasound shows single intrauterine pregnancy in vertex position.  Estimated fetal weight 7 lbs. 3 oz. or 84 percentile.  MAYNOR 24.1 cm.  Fetal heart rate 141 bpm.  Three-vessel umbilical cord.  Biophysical profile 8 out of 8.    She is having a boy named Oscar.      Obstetric History  #: 1, Date: 2008, Sex: Male, Weight: 4564 g (10 lb 1 oz), GA: 41w0d, Delivery: Vaginal, Spontaneous, Apgar1: None, Apgar5: None, Living: Living, Birth Comments: None    #: 2, Date: 10/2010, Sex: Male, Weight: 3430 g (7 lb 9 oz), GA: 38w1d, Delivery: Vaginal, Spontaneous, Apgar1: None, Apgar5: None, Living: Living, Birth Comments: None    #: 3, Date: 2013, Sex: Male, Weight: 3685 g (8 lb 2 oz), GA: 37w0d, Delivery: Vaginal, Spontaneous, Apgar1: None, Apgar5: None, Living: Living, Birth Comments: None    #: 4, Date: 03/27/15, Sex: Male, Weight: 3856 g (8 lb 8 oz), GA: 39w0d, Delivery: Vaginal, Spontaneous, Apgar1: None, Apgar5: None, Living: Living, Birth Comments: IOL     #: 5, Date: 2017, Sex: None, Weight: None, GA: 6w0d, Delivery: None, Apgar1: None, Apgar5: None, Living: None, Birth Comments: None    #: 6, Date: 2017, Sex: None, Weight: None, GA: 6w0d, Delivery: None, Apgar1: None, Apgar5: None, Living: None, Birth Comments: None    #: 7, Date: None, Sex: None, Weight: None, GA: None, Delivery: None, Apgar1: None, Apgar5: None, Living: None, Birth Comments:  None      The patient complains of the following: No new complaints    ROS  Headache: No   Visual changes: No   Swelling in legs: No   Nausea: No   Constipation: No   Diarrhea: No   Contractions: No   Leaking fluid: No   Vaginal bleeding: No   Other:      Specific topics discussed at today's visit: fetal kick counts,  labor precautions and Ultrasound results  Tests done today: GBS Swab  Tests to be done at the next visit: none    Carlyn was seen today for high risk gestation.    Diagnoses and all orders for this visit:    35 weeks gestation of pregnancy  -     Group B Strep (Molecular) - Swab, Vaginal/Rectum    Anemia during pregnancy in third trimester    Asthma affecting pregnancy in third trimester    Anxiety in pregnancy in third trimester, antepartum    H/O macrosomia in infant in prior pregnancy, currently pregnant    Large for dates

## 2018-12-07 LAB — GROUP B STREP, DNA: NEGATIVE

## 2018-12-17 NOTE — ADDENDUM NOTE
Addended by: ZEKE STEIN on: 11/20/2018 11:31 AM     Modules accepted: Orders     [facility] EMERGENCY DEPT 
[address] [place] [address] [id number] [phone] Work/School Note Date: 12/17/2018 To Whom It May concern: 
 
Veronica Renee was seen and treated today in the emergency room by the following provider(s): 
Attending Provider: Harriet Mojica MD 
Physician Assistant: CARIN Renee. Veronica Renee may return to work on 12/20/2018. Sincerely, 
 
 
 
 
Teodora Pretty.  [place], [address]

## 2018-12-20 ENCOUNTER — ROUTINE PRENATAL (OUTPATIENT)
Dept: OBSTETRICS AND GYNECOLOGY | Facility: CLINIC | Age: 28
End: 2018-12-20

## 2018-12-20 VITALS — DIASTOLIC BLOOD PRESSURE: 71 MMHG | WEIGHT: 206 LBS | BODY MASS INDEX: 35.36 KG/M2 | SYSTOLIC BLOOD PRESSURE: 105 MMHG

## 2018-12-20 DIAGNOSIS — O99.013 ANEMIA DURING PREGNANCY IN THIRD TRIMESTER: ICD-10-CM

## 2018-12-20 DIAGNOSIS — F41.9 ANXIETY IN PREGNANCY IN THIRD TRIMESTER, ANTEPARTUM: ICD-10-CM

## 2018-12-20 DIAGNOSIS — O99.513 ASTHMA AFFECTING PREGNANCY IN THIRD TRIMESTER: ICD-10-CM

## 2018-12-20 DIAGNOSIS — O99.343 ANXIETY IN PREGNANCY IN THIRD TRIMESTER, ANTEPARTUM: ICD-10-CM

## 2018-12-20 DIAGNOSIS — O09.299 H/O MACROSOMIA IN INFANT IN PRIOR PREGNANCY, CURRENTLY PREGNANT: ICD-10-CM

## 2018-12-20 DIAGNOSIS — J45.909 ASTHMA AFFECTING PREGNANCY IN THIRD TRIMESTER: ICD-10-CM

## 2018-12-20 DIAGNOSIS — Z3A.37 37 WEEKS GESTATION OF PREGNANCY: Primary | ICD-10-CM

## 2018-12-20 PROCEDURE — 99213 OFFICE O/P EST LOW 20 MIN: CPT | Performed by: OBSTETRICS & GYNECOLOGY

## 2018-12-20 NOTE — PROGRESS NOTES
Chief Complaint   Patient presents with   • OB Follow up   • High Risk Gestation     28-year-old  with estimated date of delivery 2019    Prenatal course complicated by anemia of pregnancy on iron, maternal obesity, grand multiparity, and history of macrosomic infant.  She declined 1 hour glucose testing.  Two-hour postprandial broad blood sugars were checked over a two-week period and found to be normal.     2018 she is measuring size greater than dates.  We will check ultrasound for growth and biophysical profile and perform GBS with next visit.     2018 ultrasound shows single intrauterine pregnancy in vertex position.  Estimated fetal weight 7 lbs. 3 oz. or 84 percentile.  MAYNOR 24.1 cm.  Fetal heart rate 141 bpm.  Three-vessel umbilical cord.  Biophysical profile 8 out of 8.     She is having a boy named Oscar.     GBS negative 2018.    Obstetric History  #: 1, Date: 2008, Sex: Male, Weight: 4564 g (10 lb 1 oz), GA: 41w0d, Delivery: Vaginal, Spontaneous, Apgar1: None, Apgar5: None, Living: Living, Birth Comments: None    #: 2, Date: 10/2010, Sex: Male, Weight: 3430 g (7 lb 9 oz), GA: 38w1d, Delivery: Vaginal, Spontaneous, Apgar1: None, Apgar5: None, Living: Living, Birth Comments: None    #: 3, Date: 2013, Sex: Male, Weight: 3685 g (8 lb 2 oz), GA: 37w0d, Delivery: Vaginal, Spontaneous, Apgar1: None, Apgar5: None, Living: Living, Birth Comments: None    #: 4, Date: 03/27/15, Sex: Male, Weight: 3856 g (8 lb 8 oz), GA: 39w0d, Delivery: Vaginal, Spontaneous, Apgar1: None, Apgar5: None, Living: Living, Birth Comments: IOL     #: 5, Date: 2017, Sex: None, Weight: None, GA: 6w0d, Delivery: None, Apgar1: None, Apgar5: None, Living: None, Birth Comments: None    #: 6, Date: 2017, Sex: None, Weight: None, GA: 6w0d, Delivery: None, Apgar1: None, Apgar5: None, Living: None, Birth Comments: None    #: 7, Date: None, Sex: None, Weight: None, GA: None,  Delivery: None, Apgar1: None, Apgar5: None, Living: None, Birth Comments: None      The patient complains of the following: No new complaints    ROS  Headache: No   Visual changes: No   Swelling in legs: No   Nausea: No   Constipation: No   Diarrhea: No   Contractions: No   Leaking fluid: No   Vaginal bleeding: No   Other:      Specific topics discussed at today's visit: fetal kick counts and labor precautions  Tests done today: none  Tests to be done at the next visit: [place]    Carlyn was seen today for ob follow up and high risk gestation.    Diagnoses and all orders for this visit:    37 weeks gestation of pregnancy    Anemia during pregnancy in third trimester    Asthma affecting pregnancy in third trimester    Anxiety in pregnancy in third trimester, antepartum    H/O macrosomia in infant in prior pregnancy, currently pregnant

## 2018-12-27 ENCOUNTER — ROUTINE PRENATAL (OUTPATIENT)
Dept: OBSTETRICS AND GYNECOLOGY | Facility: CLINIC | Age: 28
End: 2018-12-27

## 2018-12-27 VITALS — SYSTOLIC BLOOD PRESSURE: 110 MMHG | WEIGHT: 209 LBS | DIASTOLIC BLOOD PRESSURE: 70 MMHG | BODY MASS INDEX: 35.87 KG/M2

## 2018-12-27 DIAGNOSIS — Z3A.39 39 WEEKS GESTATION OF PREGNANCY: Primary | ICD-10-CM

## 2018-12-27 DIAGNOSIS — O99.513 ASTHMA AFFECTING PREGNANCY IN THIRD TRIMESTER: ICD-10-CM

## 2018-12-27 DIAGNOSIS — J45.909 ASTHMA AFFECTING PREGNANCY IN THIRD TRIMESTER: ICD-10-CM

## 2018-12-27 DIAGNOSIS — F41.9 ANXIETY IN PREGNANCY IN THIRD TRIMESTER, ANTEPARTUM: ICD-10-CM

## 2018-12-27 DIAGNOSIS — O09.299 H/O MACROSOMIA IN INFANT IN PRIOR PREGNANCY, CURRENTLY PREGNANT: ICD-10-CM

## 2018-12-27 DIAGNOSIS — Z3A.38 38 WEEKS GESTATION OF PREGNANCY: ICD-10-CM

## 2018-12-27 DIAGNOSIS — O99.343 ANXIETY IN PREGNANCY IN THIRD TRIMESTER, ANTEPARTUM: ICD-10-CM

## 2018-12-27 DIAGNOSIS — O99.013 ANEMIA DURING PREGNANCY IN THIRD TRIMESTER: ICD-10-CM

## 2018-12-27 PROCEDURE — 99213 OFFICE O/P EST LOW 20 MIN: CPT | Performed by: OBSTETRICS & GYNECOLOGY

## 2018-12-27 RX ORDER — SODIUM CHLORIDE 0.9 % (FLUSH) 0.9 %
3 SYRINGE (ML) INJECTION EVERY 12 HOURS SCHEDULED
Status: CANCELLED | OUTPATIENT
Start: 2018-12-27

## 2018-12-27 RX ORDER — MISOPROSTOL 100 UG/1
800 TABLET ORAL AS NEEDED
Status: CANCELLED | OUTPATIENT
Start: 2018-12-27

## 2018-12-27 RX ORDER — METHYLERGONOVINE MALEATE 0.2 MG/ML
200 INJECTION INTRAVENOUS ONCE AS NEEDED
Status: CANCELLED | OUTPATIENT
Start: 2018-12-27

## 2018-12-27 RX ORDER — SODIUM CHLORIDE, SODIUM LACTATE, POTASSIUM CHLORIDE, CALCIUM CHLORIDE 600; 310; 30; 20 MG/100ML; MG/100ML; MG/100ML; MG/100ML
125 INJECTION, SOLUTION INTRAVENOUS CONTINUOUS
Status: CANCELLED | OUTPATIENT
Start: 2018-12-27

## 2018-12-27 RX ORDER — OXYTOCIN/0.9 % SODIUM CHLORIDE 30/500 ML
2-30 PLASTIC BAG, INJECTION (ML) INTRAVENOUS
Status: CANCELLED | OUTPATIENT
Start: 2018-12-27

## 2018-12-27 RX ORDER — LIDOCAINE HYDROCHLORIDE 10 MG/ML
5 INJECTION, SOLUTION EPIDURAL; INFILTRATION; INTRACAUDAL; PERINEURAL AS NEEDED
Status: CANCELLED | OUTPATIENT
Start: 2018-12-27

## 2018-12-27 RX ORDER — CARBOPROST TROMETHAMINE 250 UG/ML
250 INJECTION, SOLUTION INTRAMUSCULAR AS NEEDED
Status: CANCELLED | OUTPATIENT
Start: 2018-12-27

## 2018-12-27 RX ORDER — SODIUM CHLORIDE 0.9 % (FLUSH) 0.9 %
3-10 SYRINGE (ML) INJECTION AS NEEDED
Status: CANCELLED | OUTPATIENT
Start: 2018-12-27

## 2018-12-27 NOTE — PROGRESS NOTES
Chief Complaint   Patient presents with   • High Risk Gestation     Carlyn Aguila is a 28 y.o. year old .      28-year-old  with estimated date of delivery 2019     Prenatal course complicated by anemia of pregnancy on iron, maternal obesity, grand multiparity, and history of macrosomic infant.  She declined 1 hour glucose testing.  Two-hour postprandial broad blood sugars were checked over a two-week period and found to be normal.     2018 she is measuring size greater than dates.  We will check ultrasound for growth and biophysical profile and perform GBS with next visit.     2018 ultrasound shows single intrauterine pregnancy in vertex position.  Estimated fetal weight 7 lbs. 3 oz. or 84 percentile.  MAYNOR 24.1 cm.  Fetal heart rate 141 bpm.  Three-vessel umbilical cord.  Biophysical profile 8 out of 8.     She is having a boy named Oscar.     GBS negative 2018.    2018 cervix is 3/50%/-3.  If she does not go into labor over the next week, she wishes to be induced on Thursday, January 3, 2018 when she will be 39 weeks and 3 days.    Obstetric History  #: 1, Date: 2008, Sex: Male, Weight: 4564 g (10 lb 1 oz), GA: 41w0d, Delivery: Vaginal, Spontaneous, Apgar1: None, Apgar5: None, Living: Living, Birth Comments: None    #: 2, Date: 10/2010, Sex: Male, Weight: 3430 g (7 lb 9 oz), GA: 38w1d, Delivery: Vaginal, Spontaneous, Apgar1: None, Apgar5: None, Living: Living, Birth Comments: None    #: 3, Date: 2013, Sex: Male, Weight: 3685 g (8 lb 2 oz), GA: 37w0d, Delivery: Vaginal, Spontaneous, Apgar1: None, Apgar5: None, Living: Living, Birth Comments: None    #: 4, Date: 03/27/15, Sex: Male, Weight: 3856 g (8 lb 8 oz), GA: 39w0d, Delivery: Vaginal, Spontaneous, Apgar1: None, Apgar5: None, Living: Living, Birth Comments: IOL     #: 5, Date: 2017, Sex: None, Weight: None, GA: 6w0d, Delivery: None, Apgar1: None, Apgar5: None, Living:  None, Birth Comments: None    #: 6, Date: 04/2017, Sex: None, Weight: None, GA: 6w0d, Delivery: None, Apgar1: None, Apgar5: None, Living: None, Birth Comments: None    #: 7, Date: None, Sex: None, Weight: None, GA: None, Delivery: None, Apgar1: None, Apgar5: None, Living: None, Birth Comments: None      The patient complains of the following: No new complaints    ROS  Headache: No   Visual changes: No   Swelling in legs: No   Nausea: No   Constipation: No   Diarrhea: No   Contractions: No   Leaking fluid: No   Vaginal bleeding: No   Other:      Specific topics discussed at today's visit: fetal kick counts, labor precautions and Labor augmentation  Tests done today: none      Carlyn was seen today for high risk gestation.    Diagnoses and all orders for this visit:    38 weeks gestation of pregnancy    Anemia during pregnancy in third trimester    Asthma affecting pregnancy in third trimester    Anxiety in pregnancy in third trimester, antepartum    H/O macrosomia in infant in prior pregnancy, currently pregnant

## 2018-12-27 NOTE — H&P
Obstetric History and Physical    Chief Complaint   Patient presents with   • High Risk Gestation     Carlyn Aguila is a 28 y.o. year old .      28-year-old  with estimated date of delivery 2019     Prenatal course complicated by anemia of pregnancy on iron, maternal obesity, grand multiparity, and history of macrosomic infant.  She declined 1 hour glucose testing.  Two-hour postprandial broad blood sugars were checked over a two-week period and found to be normal.     2018 she is measuring size greater than dates.  We will check ultrasound for growth and biophysical profile and perform GBS with next visit.     2018 ultrasound shows single intrauterine pregnancy in vertex position.  Estimated fetal weight 7 lbs. 3 oz. or 84 percentile.  MAYNOR 24.1 cm.  Fetal heart rate 141 bpm.  Three-vessel umbilical cord.  Biophysical profile 8 out of 8.     She is having a boy named Oscar.     GBS negative 2018.    2018 cervix is 3/50%/-3.  If she does not go into labor over the next week, she wishes to be induced on Thursday, January 3, 2018 when she will be 39 weeks and 3 days.       Obstetric History   #: 1, Date: 2008, Sex: Male, Weight: 4564 g (10 lb 1 oz), GA: 41w0d, Delivery: Vaginal, Spontaneous, Apgar1: None, Apgar5: None, Living: Living, Birth Comments: None    #: 2, Date: 10/2010, Sex: Male, Weight: 3430 g (7 lb 9 oz), GA: 38w1d, Delivery: Vaginal, Spontaneous, Apgar1: None, Apgar5: None, Living: Living, Birth Comments: None    #: 3, Date: 2013, Sex: Male, Weight: 3685 g (8 lb 2 oz), GA: 37w0d, Delivery: Vaginal, Spontaneous, Apgar1: None, Apgar5: None, Living: Living, Birth Comments: None    #: 4, Date: 03/27/15, Sex: Male, Weight: 3856 g (8 lb 8 oz), GA: 39w0d, Delivery: Vaginal, Spontaneous, Apgar1: None, Apgar5: None, Living: Living, Birth Comments: IOL     #: 5, Date: 2017, Sex: None, Weight: None, GA: 6w0d, Delivery: None,  Apgar1: None, Apgar5: None, Living: None, Birth Comments: None    #: 6, Date: 04/2017, Sex: None, Weight: None, GA: 6w0d, Delivery: None, Apgar1: None, Apgar5: None, Living: None, Birth Comments: None    #: 7, Date: None, Sex: None, Weight: None, GA: None, Delivery: None, Apgar1: None, Apgar5: None, Living: None, Birth Comments: None          The following portions of the patients history were reviewed and updated as appropriate: current medications, allergies, past medical history, past surgical history, past family history, past social history and problem list .       Prenatal Information:  Prenatal Results     Initial Prenatal Labs     Test Value Reference Range Date Time    Hemoglobin 11.8 g/dL 12.0 - 15.5 g/dL 07/06/18 1004    Hematocrit 33.9 % 35.0 - 45.0 % 07/06/18 1004    Platelets 317 10*3/mm3 150 - 450 10*3/mm3 10/15/18 1022    Rubella IgG 0.0 IU/mL 0.0 - 9.9 IU/mL 07/06/18 1004      Non-Immune  Immune 07/06/18 1004    Hepatitis B SAg Negative  Negative 07/06/18 1004    Hepatitis C Ab Negative  Negative 07/06/18 1004    RPR Non-Reactive  Non-Reactive 07/06/18 1004    ABO B   07/06/18 1004    Rh Positive   07/06/18 1004    Antibody Screen Negative   07/06/18 1004    HIV Negative  Negative 07/06/18 1004    Urine Culture No growth at 24 hours   07/06/18 1008    Gonorrhea Negative  Negative 07/06/18 1008    Chlamydia Negative  Negative 07/06/18 1008    TSH 1.690 mIU/mL 0.460 - 4.680 mIU/mL 01/04/18 1200          2nd and 3rd Trimester     Test Value Reference Range Date Time    Hemoglobin (repeated) 9.3 g/dL 12.0 - 15.5 g/dL 10/15/18 1022    Hematocrit (repeated) 28.4 % 35.0 - 45.0 % 10/15/18 1022    GCT        Antibody Screen (repeated)        GTT Fasting        GTT 1 Hr        GTT 2 Hr        GTT 3 Hr        Group B Strep Negative  Negative 12/06/18 0923          Drug Screening     Test Value Reference Range Date Time    Amphetamine Screen Negative  Negative 07/06/18 1008    Barbiturate Screen Negative   Negative 07/06/18 1008    Benzodiazepine Screen Negative  Negative 07/06/18 1008    Methadone Screen Negative  Negative 07/06/18 1008    Phencyclidine Screen        Opiates Screen Negative  Negative 07/06/18 1008    THC Screen Negative  Negative 07/06/18 1008    Cocaine Screen Negative  Negative 07/06/18 1008    Propoxyphene Screen        Buprenorphine Screen        Methamphetamine Screen        Oxycodone Screen Negative  Negative 07/06/18 1008    Tricyclic Antidepressants Screen              Other (Risk screening)     Test Value Reference Range Date Time    Varicella IgG        Parvovirus IgG        CMV IgG        Cystic Fibrosis        Hemoglobin electrophoresis        NIPT        MSAFP-4        AFP (for NTD only)                  External Prenatal Results     Pregnancy Outside Results - Transcribed From Office Records - See Scanned Records For Details     Test Value Date Time    Hgb 9.3 g/dL 10/15/18 1022    Hct 28.4 % 10/15/18 1022    ABO B  07/06/18 1004    Rh Positive  07/06/18 1004    Antibody Screen Negative  07/06/18 1004    Glucose Fasting GTT       Glucose Tolerance Test 1 hour       Glucose Tolerance Test 3 hour       Gonorrhea (discrete) Negative  07/06/18 1008    Chlamydia (discrete) Negative  07/06/18 1008    RPR Non-Reactive  07/06/18 1004    VDRL       Syphilis Antibody       Rubella 0.0 IU/mL 07/06/18 1004      Non-Immune  07/06/18 1004    HBsAg Negative  07/06/18 1004    Herpes Simplex Virus PCR       Herpes Simplex VIrus Culture Negative  05/29/18 1657    HIV Negative  07/06/18 1004    Hep C RNA Quant PCR       Hep C Antibody Negative  07/06/18 1004    AFP       Group B Strep Negative  12/06/18 0923    GBS Susceptibility to Clindamycin       GBS Susceptibility to Erythromycin       Fetal Fibronectin       Genetic Testing, Maternal Blood             Drug Screening     Test Value Date Time    Urine Drug Screen       Amphetamine Screen Negative  07/06/18 1008    Barbiturate Screen Negative  07/06/18  1008    Benzodiazepine Screen Negative  18 1008    Methadone Screen Negative  18 1008    Phencyclidine Screen       Opiates Screen Negative  18 1008    THC Screen Negative  18 1008    Cocaine Screen       Propoxyphene Screen       Buprenorphine Screen       Methamphetamine Screen       Oxycodone Screen Negative  18 1008    Tricyclic Antidepressants Screen                    Past OB History:     Obstetric History       T4      L4     SAB0   TAB0   Ectopic0   Molar0   Multiple0   Live Births4       # Outcome Date GA Lbr Jai/2nd Weight Sex Delivery Anes PTL Lv   7 Current            6 AB 2017 6w0d          5 AB 2017 6w0d          4 Term 03/27/15 39w0d  3856 g (8 lb 8 oz) M Vag-Spont EPI  KAYLEEN   3 Term 2013 37w0d  3685 g (8 lb 2 oz) M Vag-Spont None  KAYLEEN   2 Term 10/2010 38w1d  3430 g (7 lb 9 oz) M Vag-Spont None  KAYLEEN   1 Term 2008 41w0d  4564 g (10 lb 1 oz) M Vag-Spont None N KAYLEEN           ALLERGIES:   No Known Allergies     Home Medications:     Prior to Admission medications    Medication Sig Start Date End Date Taking? Authorizing Provider   albuterol (PROVENTIL HFA;VENTOLIN HFA) 108 (90 Base) MCG/ACT inhaler Inhale 2 puffs Every 6 (Six) Hours As Needed for Wheezing or Shortness of Air. 18  Yes Murray Dubois APRN   ferrous sulfate 324 (65 Fe) MG tablet delayed-release EC tablet Take 1 tablet by mouth 3 (Three) Times a Day With Meals. 10/15/18  Yes Zoila Huertas APRN   hydrOXYzine (VISTARIL) 25 MG capsule Take 1 capsule by mouth 3 (Three) Times a Day As Needed for Itching. 10/15/18  Yes Zoila Huertas APRN   ondansetron (ZOFRAN) 4 MG tablet Take 1 tablet by mouth Daily As Needed for Nausea or Vomiting. 18 Yes Zoila Huertas APRN   promethazine (PHENERGAN) 12.5 MG tablet Take 1 tablet by mouth Every 6 (Six) Hours As Needed for Nausea or Vomiting. 18  Yes Zoila Huertas APRN       Past Medical History: Past Medical History:    Diagnosis Date   • Anxiety    • Asthma    • Herpes    • Migraine       Past Surgical History History reviewed. No pertinent surgical history.   Family History: Family History   Problem Relation Age of Onset   • No Known Problems Father    • No Known Problems Mother    • No Known Problems Brother    • No Known Problems Sister    • No Known Problems Son    • Alzheimer's disease Paternal Grandmother    • No Known Problems Maternal Grandmother    • No Known Problems Maternal Grandfather    • No Known Problems Son    • No Known Problems Son    • No Known Problems Son       Social History:  reports that  has never smoked. she has never used smokeless tobacco.   reports that she does not drink alcohol.   reports that she does not use drugs.     Review of Systems   Constitutional: Negative for activity change, appetite change, chills, diaphoresis, fatigue, fever and unexpected weight change.   Gastrointestinal: Negative for abdominal pain, constipation, diarrhea and nausea.   Genitourinary: Negative for difficulty urinating, dyspareunia, dysuria, menstrual problem, pelvic pain, urgency, vaginal bleeding, vaginal discharge and vaginal pain.   Neurological: Negative for headaches.   Psychiatric/Behavioral: Negative for dysphoric mood. The patient is not nervous/anxious.    All other systems reviewed and are negative.      Objective       Vital Signs Range for the last 24 hours  Temperature:     Temp Source:     BP: BP: (110)/(70) 110/70   Pulse:     Respirations:     SPO2:     O2 Amount (l/min):     O2 Devices     Weight: Weight:  [94.8 kg (209 lb)] 94.8 kg (209 lb)       OBGyn Exam  Physical Examination: General appearance - alert, well appearing, and in no distress and oriented to person, place, and time  Mental status - alert, oriented to person, place, and time, normal mood, behavior, speech, dress, motor activity, and thought processes  Neck - supple, no significant adenopathy  Chest - clear to auscultation, no  wheezes, rales or rhonchi, symmetric air entry, no tachypnea, retractions or cyanosis  Heart - normal rate, regular rhythm, normal S1, S2, no murmurs, rubs, clicks or gallops  Abdomen - Gravid and nontender  Extremities - peripheral pulses normal, no pedal edema, no clubbing or cyanosis      Assessment:  1. Intrauterine pregnancy at 39 weeks and 3 days on January 3, 2019 with favorable cervix desires labor augmentation..    GBS status: Results in Past 30 Days  Result Component Current Result Ref Range Previous Result Ref Range   Group B Strep, DNA Negative (12/6/2018) Negative Not in Time Range        Plan:  1. Admit to labor and delivery on January 3, 2019 for labor augmentation.  2. Plan of care has been reviewed with staff and patient.  3. Risks, benefits of treatment plan have been discussed.  4. All questions have been answered.      Colton Horowitz MD  12/27/2018  3:16 PM

## 2018-12-29 ENCOUNTER — ANESTHESIA (OUTPATIENT)
Dept: LABOR AND DELIVERY | Facility: HOSPITAL | Age: 28
End: 2018-12-29

## 2018-12-29 ENCOUNTER — HOSPITAL ENCOUNTER (INPATIENT)
Facility: HOSPITAL | Age: 28
LOS: 1 days | Discharge: HOME | End: 2018-12-30
Attending: OBSTETRICS & GYNECOLOGY | Admitting: OBSTETRICS & GYNECOLOGY

## 2018-12-29 ENCOUNTER — ANESTHESIA EVENT (OUTPATIENT)
Dept: LABOR AND DELIVERY | Facility: HOSPITAL | Age: 28
End: 2018-12-29

## 2018-12-29 DIAGNOSIS — J45.909 ASTHMA AFFECTING PREGNANCY IN THIRD TRIMESTER: ICD-10-CM

## 2018-12-29 DIAGNOSIS — Z3A.39 39 WEEKS GESTATION OF PREGNANCY: ICD-10-CM

## 2018-12-29 DIAGNOSIS — O99.513 ASTHMA AFFECTING PREGNANCY IN THIRD TRIMESTER: ICD-10-CM

## 2018-12-29 DIAGNOSIS — O09.299 H/O MACROSOMIA IN INFANT IN PRIOR PREGNANCY, CURRENTLY PREGNANT: ICD-10-CM

## 2018-12-29 DIAGNOSIS — F41.9 ANXIETY IN PREGNANCY IN THIRD TRIMESTER, ANTEPARTUM: ICD-10-CM

## 2018-12-29 DIAGNOSIS — O99.013 ANEMIA DURING PREGNANCY IN THIRD TRIMESTER: ICD-10-CM

## 2018-12-29 DIAGNOSIS — O99.343 ANXIETY IN PREGNANCY IN THIRD TRIMESTER, ANTEPARTUM: ICD-10-CM

## 2018-12-29 LAB
ABO GROUP BLD: NORMAL
AMPHET+METHAMPHET UR QL: NEGATIVE
BARBITURATES UR QL SCN: NEGATIVE
BENZODIAZ UR QL SCN: NEGATIVE
BLD GP AB SCN SERPL QL: NEGATIVE
CANNABINOIDS SERPL QL: NEGATIVE
COCAINE UR QL: NEGATIVE
DEPRECATED RDW RBC AUTO: 39 FL (ref 36.4–46.3)
ERYTHROCYTE [DISTWIDTH] IN BLOOD BY AUTOMATED COUNT: 16.5 % (ref 11.5–14.5)
HCT VFR BLD AUTO: 30.8 % (ref 35–45)
HGB BLD-MCNC: 10.2 G/DL (ref 12–15.5)
HOLD SPECIMEN: NORMAL
Lab: NORMAL
MCH RBC QN AUTO: 21.9 PG (ref 26.5–34)
MCHC RBC AUTO-ENTMCNC: 33.1 G/DL (ref 31.4–36)
MCV RBC AUTO: 66.1 FL (ref 80–98)
METHADONE UR QL SCN: NEGATIVE
OPIATES UR QL: NEGATIVE
OXYCODONE UR QL SCN: NEGATIVE
PLATELET # BLD AUTO: 411 10*3/MM3 (ref 150–450)
PMV BLD AUTO: 8.4 FL (ref 8–12)
RBC # BLD AUTO: 4.66 10*6/MM3 (ref 3.77–5.16)
RH BLD: POSITIVE
T&S EXPIRATION DATE: NORMAL
WBC NRBC COR # BLD: 7.6 10*3/MM3 (ref 3.2–9.8)

## 2018-12-29 PROCEDURE — 86901 BLOOD TYPING SEROLOGIC RH(D): CPT | Performed by: OBSTETRICS & GYNECOLOGY

## 2018-12-29 PROCEDURE — 80307 DRUG TEST PRSMV CHEM ANLYZR: CPT | Performed by: OBSTETRICS & GYNECOLOGY

## 2018-12-29 PROCEDURE — 51703 INSERT BLADDER CATH COMPLEX: CPT

## 2018-12-29 PROCEDURE — 59410 OBSTETRICAL CARE: CPT | Performed by: OBSTETRICS & GYNECOLOGY

## 2018-12-29 PROCEDURE — 25010000002 MORPHINE PER 10 MG: Performed by: NURSE ANESTHETIST, CERTIFIED REGISTERED

## 2018-12-29 PROCEDURE — 25010000002 FENTANYL CITRATE (PF) 250 MCG/5ML SOLUTION: Performed by: NURSE ANESTHETIST, CERTIFIED REGISTERED

## 2018-12-29 PROCEDURE — 85027 COMPLETE CBC AUTOMATED: CPT | Performed by: OBSTETRICS & GYNECOLOGY

## 2018-12-29 PROCEDURE — C1755 CATHETER, INTRASPINAL: HCPCS | Performed by: NURSE ANESTHETIST, CERTIFIED REGISTERED

## 2018-12-29 PROCEDURE — 63710000001 DIPHENHYDRAMINE PER 50 MG: Performed by: OBSTETRICS & GYNECOLOGY

## 2018-12-29 PROCEDURE — 94799 UNLISTED PULMONARY SVC/PX: CPT

## 2018-12-29 PROCEDURE — 25010000002 ONDANSETRON PER 1 MG

## 2018-12-29 PROCEDURE — 10907ZC DRAINAGE OF AMNIOTIC FLUID, THERAPEUTIC FROM PRODUCTS OF CONCEPTION, VIA NATURAL OR ARTIFICIAL OPENING: ICD-10-PCS | Performed by: OBSTETRICS & GYNECOLOGY

## 2018-12-29 PROCEDURE — 86850 RBC ANTIBODY SCREEN: CPT | Performed by: OBSTETRICS & GYNECOLOGY

## 2018-12-29 PROCEDURE — 86900 BLOOD TYPING SEROLOGIC ABO: CPT | Performed by: OBSTETRICS & GYNECOLOGY

## 2018-12-29 PROCEDURE — C1755 CATHETER, INTRASPINAL: HCPCS

## 2018-12-29 RX ORDER — SODIUM CHLORIDE 0.9 % (FLUSH) 0.9 %
3-10 SYRINGE (ML) INJECTION AS NEEDED
Status: DISCONTINUED | OUTPATIENT
Start: 2018-12-29 | End: 2018-12-29 | Stop reason: HOSPADM

## 2018-12-29 RX ORDER — ACETAMINOPHEN 325 MG/1
650 TABLET ORAL ONCE
Status: COMPLETED | OUTPATIENT
Start: 2018-12-29 | End: 2018-12-29

## 2018-12-29 RX ORDER — METHYLERGONOVINE MALEATE 0.2 MG/ML
200 INJECTION INTRAVENOUS ONCE AS NEEDED
Status: DISCONTINUED | OUTPATIENT
Start: 2018-12-29 | End: 2018-12-29 | Stop reason: SDUPTHER

## 2018-12-29 RX ORDER — CALCIUM CARBONATE 200(500)MG
2 TABLET,CHEWABLE ORAL 3 TIMES DAILY PRN
Status: DISCONTINUED | OUTPATIENT
Start: 2018-12-29 | End: 2018-12-30 | Stop reason: HOSPADM

## 2018-12-29 RX ORDER — DIPHENHYDRAMINE HCL 25 MG
25 CAPSULE ORAL EVERY 12 HOURS PRN
Status: DISCONTINUED | OUTPATIENT
Start: 2018-12-29 | End: 2018-12-30 | Stop reason: HOSPADM

## 2018-12-29 RX ORDER — METHYLERGONOVINE MALEATE 0.2 MG/ML
200 INJECTION INTRAVENOUS ONCE AS NEEDED
Status: DISCONTINUED | OUTPATIENT
Start: 2018-12-29 | End: 2018-12-29 | Stop reason: HOSPADM

## 2018-12-29 RX ORDER — OXYTOCIN/RINGER'S LACTATE 20/1000 ML
PLASTIC BAG, INJECTION (ML) INTRAVENOUS
Status: COMPLETED
Start: 2018-12-29 | End: 2018-12-29

## 2018-12-29 RX ORDER — CARBOPROST TROMETHAMINE 250 UG/ML
250 INJECTION, SOLUTION INTRAMUSCULAR AS NEEDED
Status: DISCONTINUED | OUTPATIENT
Start: 2018-12-29 | End: 2018-12-29 | Stop reason: HOSPADM

## 2018-12-29 RX ORDER — IBUPROFEN 600 MG/1
600 TABLET ORAL EVERY 8 HOURS PRN
Status: DISCONTINUED | OUTPATIENT
Start: 2018-12-29 | End: 2018-12-30 | Stop reason: HOSPADM

## 2018-12-29 RX ORDER — SODIUM CHLORIDE 0.9 % (FLUSH) 0.9 %
3 SYRINGE (ML) INJECTION EVERY 12 HOURS SCHEDULED
Status: DISCONTINUED | OUTPATIENT
Start: 2018-12-29 | End: 2018-12-29

## 2018-12-29 RX ORDER — SODIUM CHLORIDE, SODIUM LACTATE, POTASSIUM CHLORIDE, CALCIUM CHLORIDE 600; 310; 30; 20 MG/100ML; MG/100ML; MG/100ML; MG/100ML
INJECTION, SOLUTION INTRAVENOUS
Status: COMPLETED
Start: 2018-12-29 | End: 2018-12-29

## 2018-12-29 RX ORDER — SODIUM CHLORIDE, SODIUM LACTATE, POTASSIUM CHLORIDE, CALCIUM CHLORIDE 600; 310; 30; 20 MG/100ML; MG/100ML; MG/100ML; MG/100ML
1000 INJECTION, SOLUTION INTRAVENOUS ONCE
Status: DISCONTINUED | OUTPATIENT
Start: 2018-12-29 | End: 2018-12-29

## 2018-12-29 RX ORDER — ACETAMINOPHEN 325 MG/1
650 TABLET ORAL EVERY 4 HOURS PRN
Status: DISCONTINUED | OUTPATIENT
Start: 2018-12-29 | End: 2018-12-30 | Stop reason: HOSPADM

## 2018-12-29 RX ORDER — MISOPROSTOL 200 UG/1
800 TABLET ORAL AS NEEDED
Status: DISCONTINUED | OUTPATIENT
Start: 2018-12-29 | End: 2018-12-29 | Stop reason: HOSPADM

## 2018-12-29 RX ORDER — ONDANSETRON 2 MG/ML
4 INJECTION INTRAMUSCULAR; INTRAVENOUS EVERY 6 HOURS PRN
Status: DISCONTINUED | OUTPATIENT
Start: 2018-12-29 | End: 2018-12-30 | Stop reason: HOSPADM

## 2018-12-29 RX ORDER — CARBOPROST TROMETHAMINE 250 UG/ML
250 INJECTION, SOLUTION INTRAMUSCULAR AS NEEDED
Status: DISCONTINUED | OUTPATIENT
Start: 2018-12-29 | End: 2018-12-29 | Stop reason: SDUPTHER

## 2018-12-29 RX ORDER — DOCUSATE SODIUM 100 MG/1
100 CAPSULE, LIQUID FILLED ORAL DAILY PRN
Status: DISCONTINUED | OUTPATIENT
Start: 2018-12-29 | End: 2018-12-30 | Stop reason: HOSPADM

## 2018-12-29 RX ORDER — ONDANSETRON 4 MG/1
4 TABLET, FILM COATED ORAL EVERY 8 HOURS PRN
Status: DISCONTINUED | OUTPATIENT
Start: 2018-12-29 | End: 2018-12-30 | Stop reason: HOSPADM

## 2018-12-29 RX ORDER — LANOLIN 100 %
OINTMENT (GRAM) TOPICAL
Status: DISCONTINUED | OUTPATIENT
Start: 2018-12-29 | End: 2018-12-30 | Stop reason: HOSPADM

## 2018-12-29 RX ORDER — SODIUM CHLORIDE, SODIUM LACTATE, POTASSIUM CHLORIDE, CALCIUM CHLORIDE 600; 310; 30; 20 MG/100ML; MG/100ML; MG/100ML; MG/100ML
125 INJECTION, SOLUTION INTRAVENOUS CONTINUOUS
Status: DISCONTINUED | OUTPATIENT
Start: 2018-12-29 | End: 2018-12-29 | Stop reason: SDUPTHER

## 2018-12-29 RX ORDER — OXYTOCIN/0.9 % SODIUM CHLORIDE 30/500 ML
2 PLASTIC BAG, INJECTION (ML) INTRAVENOUS
Status: DISCONTINUED | OUTPATIENT
Start: 2018-12-29 | End: 2018-12-29

## 2018-12-29 RX ORDER — SODIUM CHLORIDE 0.9 % (FLUSH) 0.9 %
1-10 SYRINGE (ML) INJECTION AS NEEDED
Status: DISCONTINUED | OUTPATIENT
Start: 2018-12-29 | End: 2018-12-30 | Stop reason: HOSPADM

## 2018-12-29 RX ORDER — LIDOCAINE HYDROCHLORIDE AND EPINEPHRINE 15; 5 MG/ML; UG/ML
INJECTION, SOLUTION EPIDURAL AS NEEDED
Status: DISCONTINUED | OUTPATIENT
Start: 2018-12-29 | End: 2018-12-31 | Stop reason: SURG

## 2018-12-29 RX ORDER — MISOPROSTOL 200 UG/1
800 TABLET ORAL AS NEEDED
Status: DISCONTINUED | OUTPATIENT
Start: 2018-12-29 | End: 2018-12-29 | Stop reason: SDUPTHER

## 2018-12-29 RX ORDER — ONDANSETRON 2 MG/ML
INJECTION INTRAMUSCULAR; INTRAVENOUS
Status: COMPLETED
Start: 2018-12-29 | End: 2018-12-29

## 2018-12-29 RX ORDER — ONDANSETRON 2 MG/ML
4 INJECTION INTRAMUSCULAR; INTRAVENOUS EVERY 6 HOURS PRN
Status: DISCONTINUED | OUTPATIENT
Start: 2018-12-29 | End: 2018-12-29

## 2018-12-29 RX ORDER — MORPHINE SULFATE 1 MG/ML
INJECTION, SOLUTION EPIDURAL; INTRATHECAL; INTRAVENOUS AS NEEDED
Status: DISCONTINUED | OUTPATIENT
Start: 2018-12-29 | End: 2018-12-31 | Stop reason: SURG

## 2018-12-29 RX ORDER — SODIUM CHLORIDE, SODIUM LACTATE, POTASSIUM CHLORIDE, CALCIUM CHLORIDE 600; 310; 30; 20 MG/100ML; MG/100ML; MG/100ML; MG/100ML
125 INJECTION, SOLUTION INTRAVENOUS CONTINUOUS
Status: DISCONTINUED | OUTPATIENT
Start: 2018-12-29 | End: 2018-12-29

## 2018-12-29 RX ORDER — SODIUM CHLORIDE, SODIUM LACTATE, POTASSIUM CHLORIDE, CALCIUM CHLORIDE 600; 310; 30; 20 MG/100ML; MG/100ML; MG/100ML; MG/100ML
INJECTION, SOLUTION INTRAVENOUS
Status: DISPENSED
Start: 2018-12-29 | End: 2018-12-29

## 2018-12-29 RX ORDER — ACETAMINOPHEN 325 MG/1
TABLET ORAL
Status: COMPLETED
Start: 2018-12-29 | End: 2018-12-29

## 2018-12-29 RX ORDER — FENTANYL CITRATE 50 UG/ML
INJECTION, SOLUTION INTRAMUSCULAR; INTRAVENOUS AS NEEDED
Status: DISCONTINUED | OUTPATIENT
Start: 2018-12-29 | End: 2018-12-31 | Stop reason: SURG

## 2018-12-29 RX ORDER — BISACODYL 10 MG
10 SUPPOSITORY, RECTAL RECTAL DAILY PRN
Status: DISCONTINUED | OUTPATIENT
Start: 2018-12-30 | End: 2018-12-30 | Stop reason: HOSPADM

## 2018-12-29 RX ORDER — LIDOCAINE HYDROCHLORIDE 10 MG/ML
5 INJECTION, SOLUTION EPIDURAL; INFILTRATION; INTRACAUDAL; PERINEURAL AS NEEDED
Status: DISCONTINUED | OUTPATIENT
Start: 2018-12-29 | End: 2018-12-29 | Stop reason: HOSPADM

## 2018-12-29 RX ORDER — MISOPROSTOL 200 UG/1
600 TABLET ORAL ONCE
Status: DISCONTINUED | OUTPATIENT
Start: 2018-12-29 | End: 2018-12-30 | Stop reason: HOSPADM

## 2018-12-29 RX ORDER — ZOLPIDEM TARTRATE 5 MG/1
5 TABLET ORAL NIGHTLY PRN
Status: DISCONTINUED | OUTPATIENT
Start: 2018-12-29 | End: 2018-12-30 | Stop reason: HOSPADM

## 2018-12-29 RX ADMIN — SODIUM CHLORIDE, POTASSIUM CHLORIDE, SODIUM LACTATE AND CALCIUM CHLORIDE 125 ML/HR: 600; 310; 30; 20 INJECTION, SOLUTION INTRAVENOUS at 05:01

## 2018-12-29 RX ADMIN — Medication 3 MG: at 17:05

## 2018-12-29 RX ADMIN — SODIUM CHLORIDE, POTASSIUM CHLORIDE, SODIUM LACTATE AND CALCIUM CHLORIDE 125 ML/HR: 600; 310; 30; 20 INJECTION, SOLUTION INTRAVENOUS at 13:05

## 2018-12-29 RX ADMIN — Medication 10 ML/HR: at 02:17

## 2018-12-29 RX ADMIN — LIDOCAINE HYDROCHLORIDE AND EPINEPHRINE 3 ML: 15; 5 INJECTION, SOLUTION EPIDURAL at 02:12

## 2018-12-29 RX ADMIN — ONDANSETRON HYDROCHLORIDE 4 MG: 2 INJECTION INTRAMUSCULAR; INTRAVENOUS at 09:31

## 2018-12-29 RX ADMIN — OXYTOCIN 20 UNITS: 10 INJECTION, SOLUTION INTRAMUSCULAR; INTRAVENOUS at 18:31

## 2018-12-29 RX ADMIN — SODIUM CHLORIDE, POTASSIUM CHLORIDE, SODIUM LACTATE AND CALCIUM CHLORIDE 1000 ML: 600; 310; 30; 20 INJECTION, SOLUTION INTRAVENOUS at 02:13

## 2018-12-29 RX ADMIN — OXYTOCIN 20 UNITS: 10 INJECTION, SOLUTION INTRAMUSCULAR; INTRAVENOUS at 17:19

## 2018-12-29 RX ADMIN — FENTANYL CITRATE 250 MCG: 50 INJECTION, SOLUTION INTRAMUSCULAR; INTRAVENOUS at 02:17

## 2018-12-29 RX ADMIN — OXYTOCIN-SODIUM CHLORIDE 0.9% IV SOLN 30 UNIT/500ML 2 MILLI-UNITS/MIN: 30-0.9/5 SOLUTION at 09:17

## 2018-12-29 RX ADMIN — SODIUM CHLORIDE, SODIUM LACTATE, POTASSIUM CHLORIDE, CALCIUM CHLORIDE 125 ML/HR: 600; 310; 30; 20 INJECTION, SOLUTION INTRAVENOUS at 05:01

## 2018-12-29 RX ADMIN — ACETAMINOPHEN 650 MG: 325 TABLET ORAL at 12:26

## 2018-12-29 RX ADMIN — ONDANSETRON 4 MG: 2 INJECTION INTRAMUSCULAR; INTRAVENOUS at 09:31

## 2018-12-29 RX ADMIN — SODIUM CHLORIDE, SODIUM LACTATE, POTASSIUM CHLORIDE, CALCIUM CHLORIDE 125 ML/HR: 600; 310; 30; 20 INJECTION, SOLUTION INTRAVENOUS at 13:05

## 2018-12-29 RX ADMIN — DIPHENHYDRAMINE HYDROCHLORIDE 25 MG: 25 CAPSULE ORAL at 23:46

## 2018-12-29 NOTE — ANESTHESIA PREPROCEDURE EVALUATION
Anesthesia Evaluation     NPO Solid Status: > 8 hours  NPO Liquid Status: > 2 hours           Airway   Mallampati: II  TM distance: >3 FB  Neck ROM: full  no difficulty expected  Dental - normal exam     Pulmonary - normal exam   (+) asthma,   Cardiovascular - normal exam        Neuro/Psych  (+) headaches, psychiatric history,     GI/Hepatic/Renal/Endo      Musculoskeletal     Abdominal    Substance History      OB/GYN    (+) Pregnant,         Other                        Anesthesia Plan    ASA 2     epidural     Anesthetic plan, all risks, benefits, and alternatives have been provided, discussed and informed consent has been obtained with: patient.

## 2018-12-29 NOTE — ANESTHESIA PROCEDURE NOTES
Labor Epidural      Patient location during procedure: OB  Performed By  CRNA: Christiano Noble CRNA  Preanesthetic Checklist  Completed: patient identified, site marked, surgical consent, pre-op evaluation, timeout performed, IV checked, risks and benefits discussed and monitors and equipment checked  Prep:  Pt Position:sitting  Sterile Tech:gloves, cap, gown, mask and sterile barrier  Prep:DuraPrep  Monitoring:blood pressure monitoring and continuous pulse oximetry  Epidural Block Procedure:  Approach:midline  Guidance:landmark technique and palpation technique  Location:L3-L4  Needle Type:Tuohy  Needle Gauge:17 G  Loss of Resistance Medium: air  Loss of Resistance: 8cm  Cath Depth at skin:13 cm  Paresthesia: none  Aspiration:negative  Test Dose:negative  Number of Attempts: 1  Post Assessment:  Dressing:occlusive dressing applied and secured with tape  Pt Tolerance:patient tolerated the procedure well with no apparent complications  Complications:no

## 2018-12-30 VITALS
TEMPERATURE: 97.6 F | SYSTOLIC BLOOD PRESSURE: 97 MMHG | DIASTOLIC BLOOD PRESSURE: 50 MMHG | RESPIRATION RATE: 18 BRPM | HEART RATE: 96 BPM | OXYGEN SATURATION: 97 %

## 2018-12-30 PROBLEM — Z3A.40 40 WEEKS GESTATION OF PREGNANCY: Status: ACTIVE | Noted: 2018-12-30

## 2018-12-30 PROBLEM — O99.013 ANEMIA DURING PREGNANCY IN THIRD TRIMESTER: Status: RESOLVED | Noted: 2018-11-20 | Resolved: 2018-12-30

## 2018-12-30 PROBLEM — O09.299 H/O MACROSOMIA IN INFANT IN PRIOR PREGNANCY, CURRENTLY PREGNANT: Status: RESOLVED | Noted: 2018-11-20 | Resolved: 2018-12-30

## 2018-12-30 PROBLEM — J45.909 ASTHMA AFFECTING PREGNANCY IN THIRD TRIMESTER: Status: RESOLVED | Noted: 2018-11-20 | Resolved: 2018-12-30

## 2018-12-30 PROBLEM — O99.343 ANXIETY IN PREGNANCY IN THIRD TRIMESTER, ANTEPARTUM: Status: RESOLVED | Noted: 2018-11-20 | Resolved: 2018-12-30

## 2018-12-30 PROBLEM — O99.513 ASTHMA AFFECTING PREGNANCY IN THIRD TRIMESTER: Status: RESOLVED | Noted: 2018-11-20 | Resolved: 2018-12-30

## 2018-12-30 PROBLEM — F41.9 ANXIETY IN PREGNANCY IN THIRD TRIMESTER, ANTEPARTUM: Status: RESOLVED | Noted: 2018-11-20 | Resolved: 2018-12-30

## 2018-12-30 LAB
BASOPHILS # BLD AUTO: 0.02 10*3/MM3 (ref 0–0.2)
BASOPHILS NFR BLD AUTO: 0.2 % (ref 0–2)
DEPRECATED RDW RBC AUTO: 39.9 FL (ref 36.4–46.3)
EOSINOPHIL # BLD AUTO: 0.18 10*3/MM3 (ref 0–0.7)
EOSINOPHIL NFR BLD AUTO: 1.6 % (ref 0–7)
ERYTHROCYTE [DISTWIDTH] IN BLOOD BY AUTOMATED COUNT: 16.6 % (ref 11.5–14.5)
HCT VFR BLD AUTO: 31.6 % (ref 35–45)
HGB BLD-MCNC: 10.4 G/DL (ref 12–15.5)
HYPOCHROMIA BLD QL: NORMAL
IMM GRANULOCYTES # BLD AUTO: 0.02 10*3/MM3 (ref 0–0.02)
IMM GRANULOCYTES NFR BLD AUTO: 0.2 % (ref 0–0.5)
LYMPHOCYTES # BLD AUTO: 2.15 10*3/MM3 (ref 0.6–4.2)
LYMPHOCYTES NFR BLD AUTO: 18.9 % (ref 10–50)
MCH RBC QN AUTO: 21.8 PG (ref 26.5–34)
MCHC RBC AUTO-ENTMCNC: 32.9 G/DL (ref 31.4–36)
MCV RBC AUTO: 66.4 FL (ref 80–98)
MICROCYTES BLD QL: NORMAL
MONOCYTES # BLD AUTO: 0.54 10*3/MM3 (ref 0–0.9)
MONOCYTES NFR BLD AUTO: 4.7 % (ref 0–12)
NEUTROPHILS # BLD AUTO: 8.47 10*3/MM3 (ref 2–8.6)
NEUTROPHILS NFR BLD AUTO: 74.4 % (ref 37–80)
PLATELET # BLD AUTO: 354 10*3/MM3 (ref 150–450)
PMV BLD AUTO: 8.1 FL (ref 8–12)
RBC # BLD AUTO: 4.76 10*6/MM3 (ref 3.77–5.16)
SMALL PLATELETS BLD QL SMEAR: ADEQUATE
WBC MORPH BLD: NORMAL
WBC NRBC COR # BLD: 11.38 10*3/MM3 (ref 3.2–9.8)

## 2018-12-30 PROCEDURE — 85007 BL SMEAR W/DIFF WBC COUNT: CPT | Performed by: STUDENT IN AN ORGANIZED HEALTH CARE EDUCATION/TRAINING PROGRAM

## 2018-12-30 PROCEDURE — 85025 COMPLETE CBC W/AUTO DIFF WBC: CPT | Performed by: STUDENT IN AN ORGANIZED HEALTH CARE EDUCATION/TRAINING PROGRAM

## 2018-12-30 PROCEDURE — 25010000002 ONDANSETRON PER 1 MG: Performed by: STUDENT IN AN ORGANIZED HEALTH CARE EDUCATION/TRAINING PROGRAM

## 2018-12-30 RX ADMIN — ONDANSETRON HYDROCHLORIDE 4 MG: 2 INJECTION INTRAMUSCULAR; INTRAVENOUS at 00:16

## 2019-01-03 ENCOUNTER — APPOINTMENT (OUTPATIENT)
Dept: LABOR AND DELIVERY | Facility: HOSPITAL | Age: 29
End: 2019-01-03

## 2019-01-04 NOTE — INTERVAL H&P NOTE
H&P reviewed. The patient was examined and there are no changes to the H&P.   She presented in labor December 29, 2018.  See note by Dr. rg to

## 2021-11-05 ENCOUNTER — LAB (OUTPATIENT)
Dept: LAB | Facility: OTHER | Age: 31
End: 2021-11-05

## 2021-11-05 PROCEDURE — 87086 URINE CULTURE/COLONY COUNT: CPT | Performed by: PHYSICIAN ASSISTANT

## 2021-11-05 PROCEDURE — 87077 CULTURE AEROBIC IDENTIFY: CPT | Performed by: PHYSICIAN ASSISTANT

## 2021-11-05 PROCEDURE — 87186 SC STD MICRODIL/AGAR DIL: CPT | Performed by: PHYSICIAN ASSISTANT

## 2023-02-27 DIAGNOSIS — Z32.01 POSITIVE PREGNANCY TEST: Primary | ICD-10-CM

## 2023-03-06 ENCOUNTER — INITIAL PRENATAL (OUTPATIENT)
Dept: OBSTETRICS AND GYNECOLOGY | Facility: CLINIC | Age: 33
End: 2023-03-06
Payer: COMMERCIAL

## 2023-03-06 ENCOUNTER — LAB (OUTPATIENT)
Dept: LAB | Facility: HOSPITAL | Age: 33
End: 2023-03-06
Payer: COMMERCIAL

## 2023-03-06 VITALS — WEIGHT: 215 LBS | BODY MASS INDEX: 36.9 KG/M2 | DIASTOLIC BLOOD PRESSURE: 68 MMHG | SYSTOLIC BLOOD PRESSURE: 114 MMHG

## 2023-03-06 DIAGNOSIS — Z34.80 PRENATAL CARE OF MULTIGRAVIDA, ANTEPARTUM: Primary | ICD-10-CM

## 2023-03-06 DIAGNOSIS — N89.8 VAGINAL ITCHING: ICD-10-CM

## 2023-03-06 DIAGNOSIS — Z86.32 HISTORY OF GESTATIONAL DIABETES: ICD-10-CM

## 2023-03-06 PROBLEM — O98.513 HERPES SIMPLEX VIRUS TYPE 2 (HSV-2) INFECTION AFFECTING PREGNANCY IN THIRD TRIMESTER: Status: ACTIVE | Noted: 2020-07-20

## 2023-03-06 PROBLEM — B00.9 HERPES SIMPLEX VIRUS TYPE 2 (HSV-2) INFECTION AFFECTING PREGNANCY IN THIRD TRIMESTER: Status: ACTIVE | Noted: 2020-07-20

## 2023-03-06 PROBLEM — F41.8 ANXIETY ABOUT HEALTH: Status: ACTIVE | Noted: 2022-05-24

## 2023-03-06 PROBLEM — O24.414 INSULIN CONTROLLED GESTATIONAL DIABETES MELLITUS (GDM) IN THIRD TRIMESTER: Status: ACTIVE | Noted: 2018-11-20

## 2023-03-06 LAB
ABO GROUP BLD: NORMAL
AMPHET+METHAMPHET UR QL: NEGATIVE
AMPHETAMINES UR QL: NEGATIVE
BARBITURATES UR QL SCN: NEGATIVE
BASOPHILS # BLD AUTO: 0.03 10*3/MM3 (ref 0–0.2)
BASOPHILS NFR BLD AUTO: 0.4 % (ref 0–1.5)
BENZODIAZ UR QL SCN: NEGATIVE
BILIRUB UR QL STRIP: NEGATIVE
BLD GP AB SCN SERPL QL: NEGATIVE
BUPRENORPHINE SERPL-MCNC: NEGATIVE NG/ML
CANDIDA ALBICANS: POSITIVE
CANNABINOIDS SERPL QL: NEGATIVE
CLARITY UR: CLEAR
COCAINE UR QL: NEGATIVE
COLOR UR: YELLOW
DEPRECATED RDW RBC AUTO: 41.2 FL (ref 37–54)
EOSINOPHIL # BLD AUTO: 0.15 10*3/MM3 (ref 0–0.4)
EOSINOPHIL NFR BLD AUTO: 2 % (ref 0.3–6.2)
ERYTHROCYTE [DISTWIDTH] IN BLOOD BY AUTOMATED COUNT: 14.6 % (ref 12.3–15.4)
GARDNERELLA VAGINALIS: NEGATIVE
GLUCOSE UR STRIP-MCNC: NEGATIVE MG/DL
HBV SURFACE AG SERPL QL IA: NORMAL
HCT VFR BLD AUTO: 36.8 % (ref 34–46.6)
HCV AB SER DONR QL: NORMAL
HGB BLD-MCNC: 12.3 G/DL (ref 12–15.9)
HGB UR QL STRIP.AUTO: NEGATIVE
HIV1+2 AB SER QL: NORMAL
IMM GRANULOCYTES # BLD AUTO: 0.02 10*3/MM3 (ref 0–0.05)
IMM GRANULOCYTES NFR BLD AUTO: 0.3 % (ref 0–0.5)
KETONES UR QL STRIP: NEGATIVE
LEUKOCYTE ESTERASE UR QL STRIP.AUTO: NEGATIVE
LYMPHOCYTES # BLD AUTO: 1.73 10*3/MM3 (ref 0.7–3.1)
LYMPHOCYTES NFR BLD AUTO: 23.6 % (ref 19.6–45.3)
Lab: NORMAL
MCH RBC QN AUTO: 26.1 PG (ref 26.6–33)
MCHC RBC AUTO-ENTMCNC: 33.4 G/DL (ref 31.5–35.7)
MCV RBC AUTO: 78.1 FL (ref 79–97)
METHADONE UR QL SCN: NEGATIVE
MONOCYTES # BLD AUTO: 0.35 10*3/MM3 (ref 0.1–0.9)
MONOCYTES NFR BLD AUTO: 4.8 % (ref 5–12)
NEUTROPHILS NFR BLD AUTO: 5.06 10*3/MM3 (ref 1.7–7)
NEUTROPHILS NFR BLD AUTO: 68.9 % (ref 42.7–76)
NITRITE UR QL STRIP: NEGATIVE
NRBC BLD AUTO-RTO: 0 /100 WBC (ref 0–0.2)
OPIATES UR QL: NEGATIVE
OXYCODONE UR QL SCN: NEGATIVE
PCP UR QL SCN: NEGATIVE
PH UR STRIP.AUTO: 8 [PH] (ref 5–8)
PLATELET # BLD AUTO: 464 10*3/MM3 (ref 140–450)
PMV BLD AUTO: 8.9 FL (ref 6–12)
PROPOXYPH UR QL: NEGATIVE
PROT UR QL STRIP: NEGATIVE
RBC # BLD AUTO: 4.71 10*6/MM3 (ref 3.77–5.28)
RH BLD: POSITIVE
SP GR UR STRIP: 1.02 (ref 1–1.03)
T VAGINALIS DNA VAG QL PROBE+SIG AMP: NEGATIVE
TRICYCLICS UR QL SCN: NEGATIVE
UROBILINOGEN UR QL STRIP: NORMAL
WBC NRBC COR # BLD: 7.34 10*3/MM3 (ref 3.4–10.8)

## 2023-03-06 PROCEDURE — 36415 COLL VENOUS BLD VENIPUNCTURE: CPT

## 2023-03-06 PROCEDURE — 87491 CHLMYD TRACH DNA AMP PROBE: CPT | Performed by: NURSE PRACTITIONER

## 2023-03-06 PROCEDURE — 80081 OBSTETRIC PANEL INC HIV TSTG: CPT | Performed by: NURSE PRACTITIONER

## 2023-03-06 PROCEDURE — 87510 GARDNER VAG DNA DIR PROBE: CPT | Performed by: NURSE PRACTITIONER

## 2023-03-06 PROCEDURE — 87086 URINE CULTURE/COLONY COUNT: CPT | Performed by: NURSE PRACTITIONER

## 2023-03-06 PROCEDURE — 87480 CANDIDA DNA DIR PROBE: CPT | Performed by: NURSE PRACTITIONER

## 2023-03-06 PROCEDURE — 87661 TRICHOMONAS VAGINALIS AMPLIF: CPT | Performed by: NURSE PRACTITIONER

## 2023-03-06 PROCEDURE — 87591 N.GONORRHOEAE DNA AMP PROB: CPT | Performed by: NURSE PRACTITIONER

## 2023-03-06 PROCEDURE — 86803 HEPATITIS C AB TEST: CPT | Performed by: NURSE PRACTITIONER

## 2023-03-06 PROCEDURE — 80306 DRUG TEST PRSMV INSTRMNT: CPT | Performed by: NURSE PRACTITIONER

## 2023-03-06 PROCEDURE — 87660 TRICHOMONAS VAGIN DIR PROBE: CPT | Performed by: NURSE PRACTITIONER

## 2023-03-06 PROCEDURE — 81003 URINALYSIS AUTO W/O SCOPE: CPT | Performed by: NURSE PRACTITIONER

## 2023-03-06 RX ORDER — FLUCONAZOLE 150 MG/1
150 TABLET ORAL DAILY
Qty: 1 TABLET | Refills: 0 | Status: SHIPPED | OUTPATIENT
Start: 2023-03-06 | End: 2023-03-16

## 2023-03-06 NOTE — PROGRESS NOTES
I spent approximately 60 minutes with the patient acquiring the health and history intake, reviewing prior records, discussing topics related to healthy pregnancy, entering orders, and documentation. She does not know her LMP. She had an ultrasound done today. By ultrasound, she is 9 weeks and 4 days gestation with EDC 10/5/23. The fetal heartrate on ultrasound is 178 bpm. This is her 8th  pregnancy. She has had 6 vaginal deliveries. The first 5 deliveries was done here at our facility.  Her last delivery was at [facility] in 2021 (the report is in Care Everywhere). She had GDM with that pregnancy. She says she does not want any more children after this delivery. The patient has genital herpes and will need prophylactic medicine later in pregnancy. She is complaining of vaginal itching today and thinks she may have a yeast infection.   A newob bag is given. The 1st trimester teaching was done with the patient. We discussed a healthy diet and exercise and what is recommended. I also discussed Listeriosis and Toxoplasmosis and what fish to avoid due to high mercury levels. I informed patient not to be in hot tubs, saunas, or tanning beds. We discussed that spotting may occur after intercourse which is common, but if heavy bleeding like a period occurs to [facility] hospital if clinic is closed. The patient denies use of alcohol, illicit drug use, and tobacco smoking. She plans to formula feed.  She filled out the depression screening questionnaire and scored 6. I encouraged the patient to get the TDAP vaccine in the 3rd trimester.  I discussed with the patient that a pediatrician needs to be chosen prior to delivery for the infant to have an appointment scheduled before leaving the hospital.  I discussed lab tests will be done today. She will need an early 1hr glucola for BMI 34 and history of GDM. Her last pap smear was negative on 7/17/20. All questions were answered at this time. She is  scheduled to see Melody WORKMAN on 3/16/23.

## 2023-03-07 LAB
BACTERIA SPEC AEROBE CULT: NO GROWTH
C TRACH RRNA CVX QL NAA+PROBE: NEGATIVE
N GONORRHOEA RRNA SPEC QL NAA+PROBE: NEGATIVE
RPR SER QL: NORMAL
RUBV IGG SERPL IA-ACNC: <0.9 INDEX
TRICHOMONAS VAGINALIS PCR: NEGATIVE

## 2023-03-16 ENCOUNTER — LAB (OUTPATIENT)
Dept: LAB | Facility: HOSPITAL | Age: 33
End: 2023-03-16
Payer: COMMERCIAL

## 2023-03-16 ENCOUNTER — INITIAL PRENATAL (OUTPATIENT)
Dept: OBSTETRICS AND GYNECOLOGY | Facility: CLINIC | Age: 33
End: 2023-03-16
Payer: COMMERCIAL

## 2023-03-16 VITALS — DIASTOLIC BLOOD PRESSURE: 80 MMHG | WEIGHT: 219.6 LBS | BODY MASS INDEX: 37.69 KG/M2 | SYSTOLIC BLOOD PRESSURE: 126 MMHG

## 2023-03-16 DIAGNOSIS — Z86.32 HISTORY OF GESTATIONAL DIABETES IN PRIOR PREGNANCY, CURRENTLY PREGNANT IN FIRST TRIMESTER: ICD-10-CM

## 2023-03-16 DIAGNOSIS — Z13.79 GENETIC SCREENING: Primary | ICD-10-CM

## 2023-03-16 DIAGNOSIS — O09.41 SUPERVISION OF HIGH-RISK PREGNANCY WITH GRAND MULTIPARITY IN FIRST TRIMESTER: ICD-10-CM

## 2023-03-16 DIAGNOSIS — O99.211 OBESITY AFFECTING PREGNANCY IN FIRST TRIMESTER: ICD-10-CM

## 2023-03-16 DIAGNOSIS — Z3A.11 11 WEEKS GESTATION OF PREGNANCY: ICD-10-CM

## 2023-03-16 DIAGNOSIS — O09.291 HISTORY OF GESTATIONAL DIABETES IN PRIOR PREGNANCY, CURRENTLY PREGNANT IN FIRST TRIMESTER: ICD-10-CM

## 2023-03-16 PROBLEM — O98.511 HERPES SIMPLEX VIRUS TYPE 2 (HSV-2) INFECTION AFFECTING PREGNANCY IN FIRST TRIMESTER: Status: ACTIVE | Noted: 2020-07-20

## 2023-03-16 PROBLEM — Z3A.40 40 WEEKS GESTATION OF PREGNANCY: Status: RESOLVED | Noted: 2018-12-30 | Resolved: 2023-03-16

## 2023-03-16 PROBLEM — O24.414 INSULIN CONTROLLED GESTATIONAL DIABETES MELLITUS (GDM) IN THIRD TRIMESTER: Status: RESOLVED | Noted: 2018-11-20 | Resolved: 2023-03-16

## 2023-03-16 RX ORDER — ONDANSETRON HYDROCHLORIDE 8 MG/1
8 TABLET, FILM COATED ORAL EVERY 8 HOURS PRN
Qty: 20 TABLET | Refills: 2 | Status: SHIPPED | OUTPATIENT
Start: 2023-03-16

## 2023-03-16 RX ORDER — VALACYCLOVIR HYDROCHLORIDE 1 G/1
1000 TABLET, FILM COATED ORAL DAILY
Qty: 5 TABLET | Refills: 0 | Status: SHIPPED | OUTPATIENT
Start: 2023-03-16

## 2023-03-16 NOTE — PROGRESS NOTES
[facility]  Obstetrics  Date of Service: 2023    CHIEF COMPLAINT:  New prenatal visit    HISTORY OF PRESENT ILLNESS:  Carlyn Aguila is a 33 y.o. y/o  at 11w0d by LMP (No LMP recorded (lmp unknown). Patient is pregnant.).  This was a planned pregnancy and the patient is supported by her .  Reports  nausea with vomiting.  Reports breast tenderness.  She denies any vaginal bleeding.  She has  started taking a prenatal vitamin.    REVIEW OF SYSTEMS  Review of Systems   Constitutional: Negative for activity change, appetite change, diaphoresis, fatigue, unexpected weight gain and unexpected weight loss.   Respiratory: Negative for chest tightness and shortness of breath.    Cardiovascular: Negative for chest pain and palpitations.   Gastrointestinal: Positive for nausea and vomiting. Negative for abdominal distention, abdominal pain, constipation and diarrhea.   Genitourinary: Negative for amenorrhea, breast discharge, breast lump, breast pain, decreased libido, dyspareunia, dysuria, menstrual problem, pelvic pain, vaginal bleeding, vaginal discharge and vaginal pain.   Musculoskeletal: Negative for myalgias.   Skin: Negative for color change, dry skin and skin lesions.   Neurological: Negative for light-headedness and headache.   Psychiatric/Behavioral: Negative for agitation, dysphoric mood, sleep disturbance, depressed mood and stress. The patient is not nervous/anxious.        PRENATAL RISK FACTORS  3/23 Problems (from 23 to present)     Problem Noted Resolved    Herpes simplex virus type 2 (HSV-2) infection affecting pregnancy in first trimester 2020 by Teresita Mireles, RN No    Overview Addendum 3/16/2023  4:45 PM by Melody Bal APRN     Will need suppression at 36wks, does not want mentioned in front of anyone               DATING CRITERIA:  LMP (?) -- CARMENZA ?  1TUS (3/6/23 at 9w4d) -- CARMENZA 10/5/23    OBSTETRIC HISTORY:  OB History    Para  Term  AB Living   8 6 6   1 6   SAB IAB Ectopic Molar Multiple Live Births   1         6      # Outcome Date GA Lbr Jai/2nd Weight Sex Delivery Anes PTL Lv   8 Current            7 Term 21 39w0d  4030 g (8 lb 14.2 oz) M Vag-Spont EPI N KAYLEEN      Complications: Gestational diabetes   6 Term 18 38w5d 15:31 / :11 4150 g (9 lb 2.4 oz) M Vag-Spont EPI N KAYLEEN      Complications: Failure to Progress in First Stage   5 SAB 2017 6w0d          4 Term 03/27/15 39w0d  3856 g (8 lb 8 oz) M Vag-Spont EPI  KAYLEEN      Birth Comments: IOL    3 Term 13 37w0d  3685 g (8 lb 2 oz) M Vag-Spont None Y KAYLEEN   2 Term 10/22/10 38w1d  3430 g (7 lb 9 oz) M Vag-Spont None  KAYLEEN      Birth Comments: 6-7 cm on arrival   1 Term 08 39w0d  4564 g (10 lb 1 oz) M Vag-Spont None N KAYLEEN      Birth Comments: Stadol and Phenergan used      Complications: Fetal macrosomia     GYN HISTORY:  Denies h/o sexually transmitted infections/pelvic inflammatory disease  Denies h/o abnormal pap smears  Last pap smear: 2020 NIL  Last Completed Pap Smear          PAP SMEAR (Every 3 Years) Next due on 2020  Liquid-based Pap Smear, Screening    2018  Liquid-based Pap Smear, Diagnostic    2014  Converted (Historical) Gyn Cytology    2013  Converted (Historical) Gyn Cytology    2010  Converted (Historical) Gyn Cytology    Only the first 5 history entries have been loaded, but more history exists.              Denies h/o gynecologic surgeries, including biopsies of the cervix    PAST MEDICAL HISTORY:  Past Medical History:   Diagnosis Date   • Anxiety    • Asthma    • Gestational diabetes    • Herpes    • Migraine      PAST SURGICAL HISTORY:  Past Surgical History:   Procedure Laterality Date   • WISDOM TOOTH EXTRACTION       FAMILY HISTORY:  Family History   Problem Relation Age of Onset   • No Known Problems Mother    • Cirrhosis Father    • No Known Problems Brother    • No Known Problems Son     • No Known Problems Son    • No Known Problems Son    • No Known Problems Son    • No Known Problems Son    • No Known Problems Maternal Grandmother    • No Known Problems Maternal Grandfather    • Alzheimer's disease Paternal Grandmother    • Breast cancer Other      SOCIAL HISTORY:  Social History     Socioeconomic History   • Marital status:    Tobacco Use   • Smoking status: Never   • Smokeless tobacco: Never   Vaping Use   • Vaping Use: Never used   Substance and Sexual Activity   • Alcohol use: No   • Drug use: No   • Sexual activity: Yes     Partners: Male     Comment: LAST PAP NORMAL 7/17/20     GENETIC SCREENING:  Age >34 yo as of CARMENZA: no  Thalassemia: no  NTD: no  CHD: no  Down Syndrome/MR/Fragile X/Autism: maternal second cousin  Ashkenazi Baptism with Nhan-Sachs, [place], familial dysautonomia: no  Sickle cell disease or trait: no  Hemophilia: no  Muscular dystrophy: no  Cystic fibrosis: no  Brightwood's chorea: no  Birth defects: no  Genetic/chromosomal disorders: no    INFECTION HISTORY:  TB exposure: no  HSV: no  Illness since LMP: no  Prior GBS infected child: no  STIs: no    ALLERGIES:  Allergies   Allergen Reactions   • Other Anaphylaxis     APPLES       MEDICATIONS:  Prior to Admission medications    Medication Sig Start Date End Date Taking? Authorizing Provider   ondansetron (Zofran) 8 MG tablet Take 1 tablet by mouth Every 8 (Eight) Hours As Needed for Nausea or Vomiting. 3/16/23   Melody Bal APRN   valACYclovir (Valtrex) 1000 MG tablet Take 1 tablet by mouth Daily. 3/16/23   Melody Bal APRN   fluconazole (Diflucan) 150 MG tablet Take 1 tablet by mouth Daily.  Patient not taking: Reported on 3/16/2023 3/6/23 3/16/23  Alexa Grewal APRN       PHYSICAL EXAM:   /80   Wt 99.6 kg (219 lb 9.6 oz)   LMP  (LMP Unknown)   BMI 37.69 kg/m²   General: Alert, healthy, no distress, well nourished and well developed.  Neurologic: Alert, oriented to person, place, and time.  Gait  normal.  Cranial nerves II-XII grossly intact.  HEENT: Normocephalic, atraumatic.  Extraocular muscles intact, pupils equal and reactive x2.    Teeth: Normal hygiene.  Neck: Supple, no adenopathy, thyroid normal size, non-tender, without nodularity, trachea midline.  Breasts: Deferred  Lungs: Normal respiratory effort.  Clear to auscultation bilaterally.  No wheezes, rhonci, or rales.  Heart: Regular rate and rhythm.  No murmer, rub or gallop.  Abdomen: Soft, non-tender, non-distended,no masses, no hepatosplenomegaly, no hernia.  Skin: No rash, no lesions.  Extremities: No cyanosis, clubbing or edema.  PELVIC EXAM:  Deferred    IMPRESSION:  Carlyn Aguila is a 33 y.o.  at 11w0d for a new prenatal visit.    PLAN:  1.  IUP at 11w0d  - Options counseling performed and patient desires continuation of pregnancy to term   - Prenatal labs ordered  - Genetic testing, including cystic fibrosis, was discussed and patient wants this drawn today  - Continue prenatal vitamins  - Weight gain counseling performed.   - Pregravid BMI >30: Recommend 11-20 lb  - Return to clinic in 4 weeks for return prenatal visit  - Reviewed COVID-19 visitation policy  - Reviewed COVID-19 precautions     Diagnosis Plan   1. Genetic screening  [facility] Prenatal Test: Chromosomes 13, 18, 21, X & Y: Triploidy 22Q.11.2 Deletion - Blood,      2. Supervision of high-risk pregnancy with grand multiparity in first trimester        3. History of gestational diabetes in prior pregnancy, currently pregnant in first trimester  1T glucola next visit      4. Obesity affecting pregnancy in first trimester        5. 11 weeks gestation of pregnancy          Melody Bal, JI  3/30/2023  15:44 CDT

## 2023-03-24 LAB
Lab: ABNORMAL
NTRA FETAL FRACTION: ABNORMAL
NTRA GENDER OF FETUS: ABNORMAL
NTRA MONOSOMY X AGE-BASED RISK TEXT: ABNORMAL
NTRA MONOSOMY X RESULT TEXT: ABNORMAL
NTRA MONOSOMY X RISK SCORE TEXT: ABNORMAL
NTRA TRIPLOIDY 13 18 AGE-BASED RISK TEXT: ABNORMAL
NTRA TRIPLOIDY 13 18 RESULT TEXT: ABNORMAL
NTRA TRIPLOIDY 13 18 RISK SCORE TEXT: ABNORMAL
NTRA TRISOMY 21 AGE-BASED RISK TEXT: ABNORMAL
NTRA TRISOMY 21 RESULT TEXT: ABNORMAL
NTRA TRISOMY 21 RISK SCORE TEXT: ABNORMAL

## 2023-03-27 ENCOUNTER — TELEPHONE (OUTPATIENT)
Dept: OBSTETRICS AND GYNECOLOGY | Facility: CLINIC | Age: 33
End: 2023-03-27
Payer: COMMERCIAL

## 2023-03-27 DIAGNOSIS — O09.41 SUPERVISION OF HIGH-RISK PREGNANCY WITH GRAND MULTIPARITY IN FIRST TRIMESTER: ICD-10-CM

## 2023-03-27 DIAGNOSIS — Z86.32 HISTORY OF GESTATIONAL DIABETES IN PRIOR PREGNANCY, CURRENTLY PREGNANT IN FIRST TRIMESTER: ICD-10-CM

## 2023-03-27 DIAGNOSIS — Z13.79 GENETIC SCREENING: Primary | ICD-10-CM

## 2023-03-27 DIAGNOSIS — O09.291 HISTORY OF GESTATIONAL DIABETES IN PRIOR PREGNANCY, CURRENTLY PREGNANT IN FIRST TRIMESTER: ICD-10-CM

## 2023-03-27 NOTE — TELEPHONE ENCOUNTER
PATIENT IS WANTING TO GET THE RESULTS OF HER NIPS TEST           Comments    Tanika Franco MA 3/27/2023 01:16 PM CDT        ------------------------------------    Sent msg to Melody Bal.  Awaiting instruction.        CALLED AND SPOKE WITH THE PT PER MELODY BAL AND TOLD THE PT THAT THE PREVIOUS RESULT FROM ABE IS INVALID FOR SOME SPECIFIC REASONS (PT'S BMI AND THE FACT THAT PT HAD GESTATIONAL DIABETES IN HER PREVIOUS PREGNANCY, THERE IS A BIOCHEMICAL MATTER THAT IS CAUSING THE RESULT TO BE INVALID).  ADVISED THE PT TO HAVE A REPEAT NIPS TESTING ALONG WITH 1 HOUR GLUCOSE IN THE MIDDLE OF NEXT WEEK (April 5, OR 6).  PT VERBALIZED UNDERSTANDING.

## 2023-04-13 ENCOUNTER — LAB (OUTPATIENT)
Dept: LAB | Facility: HOSPITAL | Age: 33
End: 2023-04-13
Payer: COMMERCIAL

## 2023-04-13 ENCOUNTER — ROUTINE PRENATAL (OUTPATIENT)
Dept: OBSTETRICS AND GYNECOLOGY | Facility: CLINIC | Age: 33
End: 2023-04-13
Payer: COMMERCIAL

## 2023-04-13 VITALS — BODY MASS INDEX: 37.93 KG/M2 | DIASTOLIC BLOOD PRESSURE: 72 MMHG | SYSTOLIC BLOOD PRESSURE: 110 MMHG | WEIGHT: 221 LBS

## 2023-04-13 DIAGNOSIS — O09.292 HISTORY OF GESTATIONAL DIABETES IN PRIOR PREGNANCY, CURRENTLY PREGNANT IN SECOND TRIMESTER: ICD-10-CM

## 2023-04-13 DIAGNOSIS — J30.2 SEASONAL ALLERGIES: ICD-10-CM

## 2023-04-13 DIAGNOSIS — Z13.79 GENETIC SCREENING: ICD-10-CM

## 2023-04-13 DIAGNOSIS — O09.42 SUPERVISION OF HIGH-RISK PREGNANCY WITH GRAND MULTIPARITY IN SECOND TRIMESTER: Primary | ICD-10-CM

## 2023-04-13 DIAGNOSIS — O09.41 SUPERVISION OF HIGH-RISK PREGNANCY WITH GRAND MULTIPARITY IN FIRST TRIMESTER: ICD-10-CM

## 2023-04-13 DIAGNOSIS — Z86.32 HISTORY OF GESTATIONAL DIABETES IN PRIOR PREGNANCY, CURRENTLY PREGNANT IN FIRST TRIMESTER: ICD-10-CM

## 2023-04-13 DIAGNOSIS — B00.9 HERPES SIMPLEX VIRUS TYPE 2 (HSV-2) INFECTION AFFECTING PREGNANCY IN FIRST TRIMESTER: ICD-10-CM

## 2023-04-13 DIAGNOSIS — Z3A.15 15 WEEKS GESTATION OF PREGNANCY: ICD-10-CM

## 2023-04-13 DIAGNOSIS — O98.511 HERPES SIMPLEX VIRUS TYPE 2 (HSV-2) INFECTION AFFECTING PREGNANCY IN FIRST TRIMESTER: ICD-10-CM

## 2023-04-13 DIAGNOSIS — O99.212 OBESITY AFFECTING PREGNANCY IN SECOND TRIMESTER: ICD-10-CM

## 2023-04-13 DIAGNOSIS — O09.291 HISTORY OF GESTATIONAL DIABETES IN PRIOR PREGNANCY, CURRENTLY PREGNANT IN FIRST TRIMESTER: ICD-10-CM

## 2023-04-13 DIAGNOSIS — Z86.32 HISTORY OF GESTATIONAL DIABETES IN PRIOR PREGNANCY, CURRENTLY PREGNANT IN SECOND TRIMESTER: ICD-10-CM

## 2023-04-13 LAB — GLUCOSE 1H P 100 G GLC PO SERPL-MCNC: 99 MG/DL (ref 65–139)

## 2023-04-13 PROCEDURE — 36415 COLL VENOUS BLD VENIPUNCTURE: CPT

## 2023-04-13 PROCEDURE — 82950 GLUCOSE TEST: CPT

## 2023-04-13 RX ORDER — CETIRIZINE HYDROCHLORIDE 10 MG/1
10 TABLET ORAL DAILY
Qty: 90 TABLET | Refills: 2 | Status: SHIPPED | OUTPATIENT
Start: 2023-04-13

## 2023-04-13 NOTE — PROGRESS NOTES
CC: Prenatal visit    Carlyn Aguila is a 33 y.o.  at 15w0d.  Doing well.  Denies N/V, dysuria, abnormal vaginal d/c, headaches, heartburn, constipation, cramping, regular contractions, LOF, or VB.  Having issues with her allergies recently; watery eyes, stuffiness, PND, etc. Has not taken anything OTC yet.     NIPT showed low fetal fraction. Possibly elena too early or pt may already have GDM (per genetic counselor that I spoke with about 2 weeks ago). Advised a redraw after pt's GDM status was know and no earlier than 10 after the first draw had been resulted. She would like to have that done today if possible.     /72   Wt 100 kg (221 lb)   LMP  (LMP Unknown)   BMI 37.93 kg/m²   SVE: NA     Fetal Heart Rate: 152    3/23 Problems (from 23 to present)     Problem Noted Resolved    Supervision of high-risk pregnancy with grand multiparity in second trimester 3/16/2023 by Melody Bal APRN No    History of gestational diabetes in prior pregnancy, currently pregnant in second trimester 3/16/2023 by Melody Bal APRN No    Obesity affecting pregnancy in second trimester 3/16/2023 by Melody Bal APRN No    Herpes simplex virus type 2 (HSV-2) infection affecting pregnancy in first trimester 2020 by Teresita Mireles, RN No    Overview Addendum 3/16/2023  4:45 PM by Melody Bal APRN     Will need suppression at 36wks, does not want mentioned in front of anyone               A/P: Carlyn Aguila is a 33 y.o.  at 15w0d.  - RTC in 4 weeks for anatomy scan  - NIPT with gender redrawn today through [facility]  - Sent Zyrtec daily for allergies.  - Early glucola today  - Reviewed COVID-19 visitation policy  - Reviewed COVID-19 precautions     Diagnosis Plan   1. Supervision of high-risk pregnancy with grand multiparity in second trimester        2. Seasonal allergies        3. History of gestational diabetes in prior pregnancy, currently pregnant in second trimester         4. Herpes simplex virus type 2 (HSV-2) infection affecting pregnancy in first trimester        5. Obesity affecting pregnancy in second trimester        6. 15 weeks gestation of pregnancy        7. Genetic screening  [facility] Prenatal Test Full Panel: Panorama Test Plus 5 Additional Microdeletions - Jean-Claude,        Melody Bal, JI  4/13/2023  13:20 CDT

## 2023-04-18 ENCOUNTER — PATIENT OUTREACH (OUTPATIENT)
Dept: LABOR AND DELIVERY | Facility: HOSPITAL | Age: 33
End: 2023-04-18
Payer: COMMERCIAL

## 2023-04-18 ENCOUNTER — REFERRAL TRIAGE (OUTPATIENT)
Dept: LABOR AND DELIVERY | Facility: HOSPITAL | Age: 33
End: 2023-04-18
Payer: COMMERCIAL

## 2023-04-18 NOTE — OUTREACH NOTE
[facility]  Unable to Reach       Questions/Answers    Flowsheet Row Responses   Pending Outreach Confirm Patient Interest   Call Attempt First   Outcome No answer/busy, MyChart message sent to patient   Next Call Attempt Date 04/25/23              Sue Kim RN  Maternity Nurse Navigator    4/18/2023, 10:30 CDT

## 2023-04-18 NOTE — OUTREACH NOTE
[facility]    Carlyn returned my call and I was able to explain the [facility] program to her.  She would like to think about it and have me call back to confirm her interest next week.    Sue Kim RN  Maternity Nurse Navigator    4/18/2023, 11:55 CDT

## 2023-04-21 LAB
Lab: NORMAL
NTRA 1P36 DELETION SYNDROME POPULATION-BASED RISK TEXT: NORMAL
NTRA 1P36 DELETION SYNDROME RESULT TEXT: NORMAL
NTRA 1P36 DELETION SYNDROME RISK SCORE TEXT: NORMAL
NTRA 22Q11.2 DELETION SYNDROME POPULATION-BASED RISK TEXT: NORMAL
NTRA 22Q11.2 DELETION SYNDROME POPULATION-BASED RISK TEXT: NORMAL
NTRA 22Q11.2 DELETION SYNDROME RESULT TEXT: NORMAL
NTRA 22Q11.2 DELETION SYNDROME RESULT TEXT: NORMAL
NTRA 22Q11.2 DELETION SYNDROME RISK SCORE TEXT: NORMAL
NTRA 22Q11.2 DELETION SYNDROME RISK SCORE TEXT: NORMAL
NTRA ANGELMAN SYNDROME POPULATION-BASED RISK TEXT: NORMAL
NTRA ANGELMAN SYNDROME RESULT TEXT: NORMAL
NTRA ANGELMAN SYNDROME RISK SCORE TEXT: NORMAL
NTRA CRI-DU-CHAT SYNDROME POPULATION-BASED RISK TEXT: NORMAL
NTRA CRI-DU-CHAT SYNDROME RESULT TEXT: NORMAL
NTRA CRI-DU-CHAT SYNDROME RISK SCORE TEXT: NORMAL
NTRA FETAL FRACTION: NORMAL
NTRA FETAL FRACTION: NORMAL
NTRA GENDER OF FETUS: NORMAL
NTRA GENDER OF FETUS: NORMAL
NTRA MONOSOMY X AGE-BASED RISK TEXT: NORMAL
NTRA MONOSOMY X AGE-BASED RISK TEXT: NORMAL
NTRA MONOSOMY X RESULT TEXT: NORMAL
NTRA MONOSOMY X RESULT TEXT: NORMAL
NTRA MONOSOMY X RISK SCORE TEXT: NORMAL
NTRA MONOSOMY X RISK SCORE TEXT: NORMAL
NTRA PRADER-WILLI SYNDROME POPULATION-BASED RISK TEXT: NORMAL
NTRA PRADER-WILLI SYNDROME RESULT TEXT: NORMAL
NTRA PRADER-WILLI SYNDROME RISK SCORE TEXT: NORMAL
NTRA TRIPLOIDY RESULT TEXT: NORMAL
NTRA TRIPLOIDY RESULT TEXT: NORMAL
NTRA TRISOMY 13 AGE-BASED RISK TEXT: NORMAL
NTRA TRISOMY 13 AGE-BASED RISK TEXT: NORMAL
NTRA TRISOMY 13 RESULT TEXT: NORMAL
NTRA TRISOMY 13 RESULT TEXT: NORMAL
NTRA TRISOMY 13 RISK SCORE TEXT: NORMAL
NTRA TRISOMY 13 RISK SCORE TEXT: NORMAL
NTRA TRISOMY 18 AGE-BASED RISK TEXT: NORMAL
NTRA TRISOMY 18 AGE-BASED RISK TEXT: NORMAL
NTRA TRISOMY 18 RESULT TEXT: NORMAL
NTRA TRISOMY 18 RESULT TEXT: NORMAL
NTRA TRISOMY 18 RISK SCORE TEXT: NORMAL
NTRA TRISOMY 18 RISK SCORE TEXT: NORMAL
NTRA TRISOMY 21 AGE-BASED RISK TEXT: NORMAL
NTRA TRISOMY 21 AGE-BASED RISK TEXT: NORMAL
NTRA TRISOMY 21 RESULT TEXT: NORMAL
NTRA TRISOMY 21 RESULT TEXT: NORMAL
NTRA TRISOMY 21 RISK SCORE TEXT: NORMAL
NTRA TRISOMY 21 RISK SCORE TEXT: NORMAL

## 2023-04-28 ENCOUNTER — PATIENT OUTREACH (OUTPATIENT)
Dept: LABOR AND DELIVERY | Facility: HOSPITAL | Age: 33
End: 2023-04-28
Payer: COMMERCIAL

## 2023-04-28 NOTE — OUTREACH NOTE
[facility]  Unable to Reach       Questions/Answers    Flowsheet Row Responses   Pending Outreach Confirm Patient Interest   Call Attempt Second   Outcome No answer/busy   Unable to reach comments: Will close next week.              Sue Kim RN  Maternity Nurse Navigator    4/28/2023, 13:24 CDT

## 2023-05-11 ENCOUNTER — ROUTINE PRENATAL (OUTPATIENT)
Dept: OBSTETRICS AND GYNECOLOGY | Facility: CLINIC | Age: 33
End: 2023-05-11
Payer: COMMERCIAL

## 2023-05-11 VITALS — DIASTOLIC BLOOD PRESSURE: 74 MMHG | SYSTOLIC BLOOD PRESSURE: 122 MMHG | WEIGHT: 224.6 LBS | BODY MASS INDEX: 38.55 KG/M2

## 2023-05-11 DIAGNOSIS — O99.212 OBESITY AFFECTING PREGNANCY IN SECOND TRIMESTER: ICD-10-CM

## 2023-05-11 DIAGNOSIS — Z64.1 GRAND MULTIPARITY: ICD-10-CM

## 2023-05-11 DIAGNOSIS — O98.512 HERPES SIMPLEX VIRUS TYPE 2 (HSV-2) INFECTION AFFECTING PREGNANCY IN SECOND TRIMESTER: ICD-10-CM

## 2023-05-11 DIAGNOSIS — O09.292 HISTORY OF GESTATIONAL DIABETES IN PRIOR PREGNANCY, CURRENTLY PREGNANT IN SECOND TRIMESTER: ICD-10-CM

## 2023-05-11 DIAGNOSIS — Z3A.19 19 WEEKS GESTATION OF PREGNANCY: ICD-10-CM

## 2023-05-11 DIAGNOSIS — Z86.32 HISTORY OF GESTATIONAL DIABETES IN PRIOR PREGNANCY, CURRENTLY PREGNANT IN SECOND TRIMESTER: ICD-10-CM

## 2023-05-11 DIAGNOSIS — O09.92 SUPERVISION OF HIGH RISK PREGNANCY IN SECOND TRIMESTER: Primary | ICD-10-CM

## 2023-05-11 DIAGNOSIS — Z30.09 UNWANTED FERTILITY: ICD-10-CM

## 2023-05-11 DIAGNOSIS — B00.9 HERPES SIMPLEX VIRUS TYPE 2 (HSV-2) INFECTION AFFECTING PREGNANCY IN SECOND TRIMESTER: ICD-10-CM

## 2023-05-11 PROBLEM — O09.899 RUBELLA NON-IMMUNE STATUS, ANTEPARTUM: Status: ACTIVE | Noted: 2023-05-11

## 2023-05-11 PROBLEM — Z28.39 RUBELLA NON-IMMUNE STATUS, ANTEPARTUM: Status: ACTIVE | Noted: 2023-05-11

## 2023-05-11 NOTE — PROGRESS NOTES
CC: Prenatal visit    Carlyn Aguila is a 33 y.o.  at 19w0d.  Doing well.  Denies contractions, LOF, or VB.    /74   Wt 102 kg (224 lb 9.6 oz)   LMP  (LMP Unknown)   BMI 38.55 kg/m²   Fetal Heart Rate: 149     Prelim US- EFW 280g (58%ile) w/ AC 39%ile, MAYNOR 13.1 cm, cephalic, placenta right anterior w/o previa, anatomy WNL, GIRL, CL 4.68 cm, R and L ovary WNL    3/23 Problems (from 23 to present)     Problem Noted Resolved    Grand multiparity 2023 by Chelsea Walsh MD No    Unwanted fertility 2023 by Chelsea Walsh MD No    Supervision of high risk pregnancy in second trimester 3/16/2023 by Meoldy Bal APRN No    History of gestational diabetes in prior pregnancy, currently pregnant in second trimester 3/16/2023 by Melody Bal APRN No    Overview Signed 2023  8:03 AM by Chelsea Walsh MD     Early glucola WNL         Obesity affecting pregnancy in second trimester 3/16/2023 by Melody Bal APRN No    Herpes simplex virus type 2 (HSV-2) infection affecting pregnancy in second trimester 2020 by Teresita Mireles, RN No    Overview Addendum 3/16/2023  4:45 PM by Melody Bal APRN     Will need suppression at 36wks, does not want mentioned in front of anyone             A/P: Carlyn Aguila is a 33 y.o.  at 19w0d.  - RTC in 4 weeks  - RTC in 8 weeks w/ 3T labs, Tdap, breastpump script  - Discussed contraception.  She states that she does not want any more children and is interested in sterilization; will sign BTL MA consents at 28 wks.  - She states that she wants to have an epidural but had a bad experience at [facility].  She states that the CRNA was shaking the epidural needle into her back and she is worried that she could have paralysis because of that.  Discussed that we will have anesthesia see her on L&D either in labor or early during IOL and evaluate her spine.     Diagnosis Plan   1. Supervision  of high risk pregnancy in second trimester  CBC (No Diff)    Glucose, Post 50 Gm Glucola    TSH      2. History of gestational diabetes in prior pregnancy, currently pregnant in second trimester        3. Herpes simplex virus type 2 (HSV-2) infection affecting pregnancy in second trimester        4. Obesity affecting pregnancy in second trimester        5. Grand multiparity        6. Unwanted fertility        7. 19 weeks gestation of pregnancy          Chelsea Walsh MD  5/11/2023  08:54 CDT

## 2023-05-11 NOTE — LETTER
May 11, 2023     Patient: Carlyn Aguila   YOB: 1990   Date of Visit: 5/11/2023       To Whom It May Concern:    Please excuse Carlyn Aguila from work today.           Sincerely,    Chelsea Walsh MD

## 2023-06-15 ENCOUNTER — ROUTINE PRENATAL (OUTPATIENT)
Dept: OBSTETRICS AND GYNECOLOGY | Facility: CLINIC | Age: 33
End: 2023-06-15
Payer: COMMERCIAL

## 2023-06-15 VITALS — WEIGHT: 227 LBS | DIASTOLIC BLOOD PRESSURE: 62 MMHG | BODY MASS INDEX: 38.96 KG/M2 | SYSTOLIC BLOOD PRESSURE: 116 MMHG

## 2023-06-15 DIAGNOSIS — B00.9 HERPES SIMPLEX VIRUS TYPE 2 (HSV-2) INFECTION AFFECTING PREGNANCY IN SECOND TRIMESTER: ICD-10-CM

## 2023-06-15 DIAGNOSIS — O09.92 SUPERVISION OF HIGH RISK PREGNANCY IN SECOND TRIMESTER: ICD-10-CM

## 2023-06-15 DIAGNOSIS — Z3A.24 24 WEEKS GESTATION OF PREGNANCY: Primary | ICD-10-CM

## 2023-06-15 DIAGNOSIS — O98.512 HERPES SIMPLEX VIRUS TYPE 2 (HSV-2) INFECTION AFFECTING PREGNANCY IN SECOND TRIMESTER: ICD-10-CM

## 2023-06-15 DIAGNOSIS — Z86.32 HISTORY OF GESTATIONAL DIABETES IN PRIOR PREGNANCY, CURRENTLY PREGNANT IN SECOND TRIMESTER: ICD-10-CM

## 2023-06-15 DIAGNOSIS — O9A.212 TRAUMATIC INJURY DURING PREGNANCY IN SECOND TRIMESTER: ICD-10-CM

## 2023-06-15 DIAGNOSIS — Z30.09 UNWANTED FERTILITY: ICD-10-CM

## 2023-06-15 DIAGNOSIS — O99.212 OBESITY AFFECTING PREGNANCY IN SECOND TRIMESTER: ICD-10-CM

## 2023-06-15 DIAGNOSIS — Z64.1 GRAND MULTIPARITY: ICD-10-CM

## 2023-06-15 DIAGNOSIS — O09.292 HISTORY OF GESTATIONAL DIABETES IN PRIOR PREGNANCY, CURRENTLY PREGNANT IN SECOND TRIMESTER: ICD-10-CM

## 2023-06-15 DIAGNOSIS — O09.899 RUBELLA NON-IMMUNE STATUS, ANTEPARTUM: ICD-10-CM

## 2023-06-15 DIAGNOSIS — Z28.39 RUBELLA NON-IMMUNE STATUS, ANTEPARTUM: ICD-10-CM

## 2023-06-15 NOTE — PROGRESS NOTES
CC: Prenatal visit    Carlyn Aguila is a 33 y.o.  at 24w0d.  Doing well.  Patient is a little concerned.  Two of her older children were fighting and landed on her abdomen on Monday. After that incident she felt like her abdomen was hard on one side and baby did not move as much.  She then noticed some mucus like vaginal discharge.  Discussed US, pt agreeable to US if she scan have it done in timely manner because she has 5 children waiting on her.  Denies contractions, LOF, or VB.      /62   Wt 103 kg (227 lb)   LMP  (LMP Unknown)   BMI 38.96 kg/m²      Limited US preliminary- fetus vertex, placenta anterior, MAYNOR 11.65cm,  bpm       3/23 Problems (from 23 to present)       Problem Noted Resolved    Grand multiparity 2023 by Chelsea Walsh MD No    Unwanted fertility 2023 by Chelsea Walsh MD No    Rubella non-immune status, antepartum 2023 by Chelsea Walsh MD No    Overview Signed 2023  8:55 AM by Chelsea Walsh MD     Needs MMR PP         Supervision of high risk pregnancy in second trimester 3/16/2023 by Melody Bal APRN No    History of gestational diabetes in prior pregnancy, currently pregnant in second trimester 3/16/2023 by Melody Bal APRN No    Overview Signed 2023  8:03 AM by Chelsea Walsh MD     Early glucola WNL         Obesity affecting pregnancy in second trimester 3/16/2023 by Melody Bal APRN No    Herpes simplex virus type 2 (HSV-2) infection affecting pregnancy in second trimester 2020 by Teresita Mireles, RN No    Overview Addendum 3/16/2023  4:45 PM by Melody Bal APRN     Will need suppression at 36wks, does not want mentioned in front of anyone                 A/P: Carlyn Aguila is a 33 y.o.  at 24w0d.  Discussed FM with anterior placenta.  FKCs start after 28 weeks.    - RTC in 3 weeks CHERYL appt 3T labs, TL consent,  declines Tdap, breast pump script if indicated     Diagnosis Plan   1. 24 weeks gestation of pregnancy        2. Supervision of high risk pregnancy in second trimester        3. Grand multiparity        4. Unwanted fertility        5. History of gestational diabetes in prior pregnancy, currently pregnant in second trimester        6. Obesity affecting pregnancy in second trimester        7. Herpes simplex virus type 2 (HSV-2) infection affecting pregnancy in second trimester        8. Rubella non-immune status, antepartum        9. Traumatic injury during pregnancy in second trimester  US ob limited 1 + fetuses          JI Aguilar  6/15/2023  10:15 CDT

## 2023-07-20 ENCOUNTER — LAB (OUTPATIENT)
Dept: LAB | Facility: HOSPITAL | Age: 33
End: 2023-07-20
Payer: COMMERCIAL

## 2023-07-20 DIAGNOSIS — O09.92 SUPERVISION OF HIGH RISK PREGNANCY IN SECOND TRIMESTER: ICD-10-CM

## 2023-07-20 PROBLEM — O09.43 SUPERVISION OF HIGH-RISK PREGNANCY WITH GRAND MULTIPARITY IN THIRD TRIMESTER: Status: ACTIVE | Noted: 2023-05-11

## 2023-07-20 PROBLEM — O09.293 HISTORY OF GESTATIONAL DIABETES IN PRIOR PREGNANCY, CURRENTLY PREGNANT IN THIRD TRIMESTER: Status: ACTIVE | Noted: 2023-03-16

## 2023-07-20 PROBLEM — O99.213 OBESITY AFFECTING PREGNANCY IN THIRD TRIMESTER: Status: ACTIVE | Noted: 2023-03-16

## 2023-07-20 LAB
DEPRECATED RDW RBC AUTO: 35 FL (ref 37–54)
ERYTHROCYTE [DISTWIDTH] IN BLOOD BY AUTOMATED COUNT: 14.4 % (ref 12.3–15.4)
GLUCOSE 1H P 100 G GLC PO SERPL-MCNC: 149 MG/DL (ref 65–139)
HCT VFR BLD AUTO: 29.6 % (ref 34–46.6)
HGB BLD-MCNC: 9.9 G/DL (ref 12–15.9)
MCH RBC QN AUTO: 23.3 PG (ref 26.6–33)
MCHC RBC AUTO-ENTMCNC: 33.4 G/DL (ref 31.5–35.7)
MCV RBC AUTO: 69.6 FL (ref 79–97)
PLATELET # BLD AUTO: 483 10*3/MM3 (ref 140–450)
PMV BLD AUTO: 8.8 FL (ref 6–12)
RBC # BLD AUTO: 4.25 10*6/MM3 (ref 3.77–5.28)
WBC NRBC COR # BLD: 7.93 10*3/MM3 (ref 3.4–10.8)

## 2023-07-20 PROCEDURE — 36415 COLL VENOUS BLD VENIPUNCTURE: CPT

## 2023-07-20 PROCEDURE — 85027 COMPLETE CBC AUTOMATED: CPT

## 2023-07-20 PROCEDURE — 82950 GLUCOSE TEST: CPT

## 2023-07-26 ENCOUNTER — TELEPHONE (OUTPATIENT)
Dept: OBSTETRICS AND GYNECOLOGY | Facility: CLINIC | Age: 33
End: 2023-07-26
Payer: COMMERCIAL

## 2023-07-26 NOTE — TELEPHONE ENCOUNTER
PATIENT IS DOING HER 3 HR GLUCOSE TEST AT 830AM ON 08/04/23. SHE IS WANTING TO KNOW IF SHE CAN COME IN ON THAT DAY TO SEE MELODY BEFORE @8. SHE HAS AN APPOINTMENT ON 08/03 BUT DOESN'T WANT TO DRIVE HERE TWO DAYS IN A ROW         Spoke with the lab and they are ok with pt coming on Thursday after she sees Melody to do her 3 hour gtt.  Pt is aware.

## 2023-08-03 ENCOUNTER — LAB (OUTPATIENT)
Dept: LAB | Facility: HOSPITAL | Age: 33
End: 2023-08-03
Payer: COMMERCIAL

## 2023-08-03 ENCOUNTER — ROUTINE PRENATAL (OUTPATIENT)
Dept: OBSTETRICS AND GYNECOLOGY | Facility: CLINIC | Age: 33
End: 2023-08-03
Payer: COMMERCIAL

## 2023-08-03 VITALS — WEIGHT: 227.8 LBS | SYSTOLIC BLOOD PRESSURE: 108 MMHG | BODY MASS INDEX: 39.1 KG/M2 | DIASTOLIC BLOOD PRESSURE: 68 MMHG

## 2023-08-03 DIAGNOSIS — Z28.39 RUBELLA NON-IMMUNE STATUS, ANTEPARTUM: ICD-10-CM

## 2023-08-03 DIAGNOSIS — O26.813 PREGNANCY RELATED FATIGUE IN THIRD TRIMESTER: ICD-10-CM

## 2023-08-03 DIAGNOSIS — O09.43 SUPERVISION OF HIGH-RISK PREGNANCY WITH GRAND MULTIPARITY IN THIRD TRIMESTER: ICD-10-CM

## 2023-08-03 DIAGNOSIS — O26.843 UTERINE SIZE-DATE DISCREPANCY IN THIRD TRIMESTER: ICD-10-CM

## 2023-08-03 DIAGNOSIS — O09.293 HISTORY OF GESTATIONAL DIABETES IN PRIOR PREGNANCY, CURRENTLY PREGNANT IN THIRD TRIMESTER: ICD-10-CM

## 2023-08-03 DIAGNOSIS — B00.9 HERPES SIMPLEX VIRUS TYPE 2 (HSV-2) INFECTION AFFECTING PREGNANCY IN THIRD TRIMESTER: Primary | ICD-10-CM

## 2023-08-03 DIAGNOSIS — Z30.09 UNWANTED FERTILITY: ICD-10-CM

## 2023-08-03 DIAGNOSIS — O98.513 HERPES SIMPLEX VIRUS TYPE 2 (HSV-2) INFECTION AFFECTING PREGNANCY IN THIRD TRIMESTER: Primary | ICD-10-CM

## 2023-08-03 DIAGNOSIS — Z3A.31 31 WEEKS GESTATION OF PREGNANCY: ICD-10-CM

## 2023-08-03 DIAGNOSIS — Z36.9 ANTENATAL SCREENING ENCOUNTER: ICD-10-CM

## 2023-08-03 DIAGNOSIS — Z86.32 HISTORY OF GESTATIONAL DIABETES IN PRIOR PREGNANCY, CURRENTLY PREGNANT IN THIRD TRIMESTER: ICD-10-CM

## 2023-08-03 DIAGNOSIS — O09.899 RUBELLA NON-IMMUNE STATUS, ANTEPARTUM: ICD-10-CM

## 2023-08-03 DIAGNOSIS — O99.213 OBESITY AFFECTING PREGNANCY IN THIRD TRIMESTER: ICD-10-CM

## 2023-08-10 ENCOUNTER — ROUTINE PRENATAL (OUTPATIENT)
Dept: OBSTETRICS AND GYNECOLOGY | Facility: CLINIC | Age: 33
End: 2023-08-10
Payer: COMMERCIAL

## 2023-08-10 VITALS — SYSTOLIC BLOOD PRESSURE: 126 MMHG | DIASTOLIC BLOOD PRESSURE: 78 MMHG | BODY MASS INDEX: 39.31 KG/M2 | WEIGHT: 229 LBS

## 2023-08-10 DIAGNOSIS — Z28.39 RUBELLA NON-IMMUNE STATUS, ANTEPARTUM: ICD-10-CM

## 2023-08-10 DIAGNOSIS — O09.43 SUPERVISION OF HIGH-RISK PREGNANCY WITH GRAND MULTIPARITY IN THIRD TRIMESTER: ICD-10-CM

## 2023-08-10 DIAGNOSIS — Z86.32 HISTORY OF GESTATIONAL DIABETES IN PRIOR PREGNANCY, CURRENTLY PREGNANT IN THIRD TRIMESTER: ICD-10-CM

## 2023-08-10 DIAGNOSIS — O98.513 HERPES SIMPLEX VIRUS TYPE 2 (HSV-2) INFECTION AFFECTING PREGNANCY IN THIRD TRIMESTER: Primary | ICD-10-CM

## 2023-08-10 DIAGNOSIS — Z30.09 UNWANTED FERTILITY: ICD-10-CM

## 2023-08-10 DIAGNOSIS — O09.899 RUBELLA NON-IMMUNE STATUS, ANTEPARTUM: ICD-10-CM

## 2023-08-10 DIAGNOSIS — O99.213 OBESITY AFFECTING PREGNANCY IN THIRD TRIMESTER: ICD-10-CM

## 2023-08-10 DIAGNOSIS — Z3A.32 32 WEEKS GESTATION OF PREGNANCY: ICD-10-CM

## 2023-08-10 DIAGNOSIS — B00.9 HERPES SIMPLEX VIRUS TYPE 2 (HSV-2) INFECTION AFFECTING PREGNANCY IN THIRD TRIMESTER: Primary | ICD-10-CM

## 2023-08-10 DIAGNOSIS — O09.293 HISTORY OF GESTATIONAL DIABETES IN PRIOR PREGNANCY, CURRENTLY PREGNANT IN THIRD TRIMESTER: ICD-10-CM

## 2023-08-10 RX ORDER — LANCETS
EACH MISCELLANEOUS
Qty: 120 EACH | Refills: 4 | Status: SHIPPED | OUTPATIENT
Start: 2023-08-10

## 2023-08-10 RX ORDER — BLOOD SUGAR DIAGNOSTIC
1 STRIP MISCELLANEOUS 4 TIMES DAILY
Qty: 120 EACH | Refills: 4 | Status: SHIPPED | OUTPATIENT
Start: 2023-08-10

## 2023-08-10 RX ORDER — BLOOD-GLUCOSE METER
1 KIT MISCELLANEOUS AS NEEDED
Qty: 1 EACH | Refills: 0 | Status: SHIPPED | OUTPATIENT
Start: 2023-08-10

## 2023-08-10 NOTE — PROGRESS NOTES
CC: Prenatal visit    Carlyn Aguila is a 33 y.o.  at 32w0d.  Doing well.  Denies N/V, dysuria, abnormal vaginal d/c, headaches, heartburn, constipation, cramping, regular contractions, LOF, or VB.  Reports good FM.    /78   Wt 104 kg (229 lb)   LMP  (LMP Unknown)   BMI 39.31 kg/mý   SVE: Na    GS preliminary report reviewed; S>D at last visit. BPP- . EFW- 2153g, 78%tile. AC- 75%tile. MAYNOR- 15.72cm.     Fetal Heart Rate: 144    3/23 Problems (from 23 to present)       Problem Noted Resolved    Supervision of high-risk pregnancy with grand multiparity in third trimester 2023 by Chelsea Walsh MD No    Unwanted fertility 2023 by Chelsea Walsh MD No    Overview Signed 8/10/2023  4:09 PM by Melody Bal APRN     8/10- tubal papers signed.         Rubella non-immune status, antepartum 2023 by Chelsea Walsh MD No    Overview Signed 2023  8:55 AM by Chelsea Walsh MD     Needs MMR PP         History of gestational diabetes in prior pregnancy, currently pregnant in third trimester 3/16/2023 by Melody Bal APRN No    Overview Signed 2023  8:03 AM by Chelsea Walsh MD     Early glucola WNL         Obesity affecting pregnancy in third trimester 3/16/2023 by Melody Bal APRN No    Herpes simplex virus type 2 (HSV-2) infection affecting pregnancy in third trimester 2020 by Teresita Mireles, RN No    Overview Addendum 3/16/2023  4:45 PM by Melody Bal APRN     Will need suppression at 36wks, does not want mentioned in front of anyone                 A/P: Carlyn Aguila is a 33 y.o.  at 32w0d.  - RTC in 1 week for CHERYL visit  - Never received BS testing supplies. Sent today. Instructions for GDM testing given. Need to review log in 7 days to assess for GDM.  - Tubal papers reviewed with pt and signed today.  - Reviewed COVID-19 visitation policy  - Reviewed COVID-19  precautions     Diagnosis Plan   1. Herpes simplex virus type 2 (HSV-2) infection affecting pregnancy in third trimester        2. History of gestational diabetes in prior pregnancy, currently pregnant in third trimester        3. Obesity affecting pregnancy in third trimester        4. Supervision of high-risk pregnancy with grand multiparity in third trimester        5. Unwanted fertility        6. Rubella non-immune status, antepartum        7. 32 weeks gestation of pregnancy          JI Marroquin  8/10/2023  16:09 CDT

## 2023-08-17 ENCOUNTER — ROUTINE PRENATAL (OUTPATIENT)
Dept: OBSTETRICS AND GYNECOLOGY | Facility: CLINIC | Age: 33
End: 2023-08-17
Payer: COMMERCIAL

## 2023-08-17 VITALS — BODY MASS INDEX: 39.82 KG/M2 | WEIGHT: 232 LBS | SYSTOLIC BLOOD PRESSURE: 128 MMHG | DIASTOLIC BLOOD PRESSURE: 68 MMHG

## 2023-08-17 DIAGNOSIS — O09.293 HISTORY OF GESTATIONAL DIABETES IN PRIOR PREGNANCY, CURRENTLY PREGNANT IN THIRD TRIMESTER: ICD-10-CM

## 2023-08-17 DIAGNOSIS — Z28.39 RUBELLA NON-IMMUNE STATUS, ANTEPARTUM: ICD-10-CM

## 2023-08-17 DIAGNOSIS — B00.9 HERPES SIMPLEX VIRUS TYPE 2 (HSV-2) INFECTION AFFECTING PREGNANCY IN THIRD TRIMESTER: Primary | ICD-10-CM

## 2023-08-17 DIAGNOSIS — O98.513 HERPES SIMPLEX VIRUS TYPE 2 (HSV-2) INFECTION AFFECTING PREGNANCY IN THIRD TRIMESTER: Primary | ICD-10-CM

## 2023-08-17 DIAGNOSIS — O09.899 RUBELLA NON-IMMUNE STATUS, ANTEPARTUM: ICD-10-CM

## 2023-08-17 DIAGNOSIS — O99.213 OBESITY AFFECTING PREGNANCY IN THIRD TRIMESTER: ICD-10-CM

## 2023-08-17 DIAGNOSIS — Z30.09 UNWANTED FERTILITY: ICD-10-CM

## 2023-08-17 DIAGNOSIS — Z3A.33 33 WEEKS GESTATION OF PREGNANCY: ICD-10-CM

## 2023-08-17 DIAGNOSIS — Z86.32 HISTORY OF GESTATIONAL DIABETES IN PRIOR PREGNANCY, CURRENTLY PREGNANT IN THIRD TRIMESTER: ICD-10-CM

## 2023-08-17 DIAGNOSIS — O24.419 GESTATIONAL DIABETES MELLITUS (GDM) IN THIRD TRIMESTER, GESTATIONAL DIABETES METHOD OF CONTROL UNSPECIFIED: ICD-10-CM

## 2023-08-17 DIAGNOSIS — O09.43 SUPERVISION OF HIGH-RISK PREGNANCY WITH GRAND MULTIPARITY IN THIRD TRIMESTER: ICD-10-CM

## 2023-08-17 NOTE — PROGRESS NOTES
CC: Prenatal visit    Carlyn Aguila is a 33 y.o.  at 33w0d.  Doing well.  Denies N/V, dysuria, abnormal vaginal d/c, headaches, heartburn, constipation, cramping, regular contractions, LOF, or VB.  Reports good FM.    /68   Wt 105 kg (232 lb)   LMP  (LMP Unknown)   BMI 39.82 kg/mý   SVE: NA  Fundal Height (cm): 36 cm  Fetal Heart Rate: 145    Declined 3 hour glucola and brought her 1 week BS log in today for review. Abnormals: , PP: . Total % abnormal: 48%    3/23 Problems (from 23 to present)       Problem Noted Resolved    Supervision of high-risk pregnancy with grand multiparity in third trimester 2023 by Chelsea Walsh MD No    Unwanted fertility 2023 by Chelsea Walsh MD No    Overview Signed 8/10/2023  4:09 PM by Melody Bal APRN     8/10- tubal papers signed.         Rubella non-immune status, antepartum 2023 by Chelsea Walsh MD No    Overview Signed 2023  8:55 AM by Chelsea Walsh MD     Needs MMR PP         History of gestational diabetes in prior pregnancy, currently pregnant in third trimester 3/16/2023 by Melody Bal APRN No    Overview Signed 2023  8:03 AM by Chelsea Walsh MD     Early glucola WNL         Obesity affecting pregnancy in third trimester 3/16/2023 by Melody Bal APRN No    Herpes simplex virus type 2 (HSV-2) infection affecting pregnancy in third trimester 2020 by Teresita Mireles, RN No    Overview Addendum 3/16/2023  4:45 PM by Melody Bal APRN     Will need suppression at 36wks, does not want mentioned in front of anyone                 A/P: Carlyn Aguila is a 33 y.o.  at 33w0d.  - RTC in 1 week for BPP & MAYNOR  - Discussed management of GDM and encouraged drastic dietary changes and will see Dr. Walsh next week following BPP for review of log. Only doing BPP b/c she's still measuring large and has had a later dx of  GDM.     Diagnosis Plan   1. Herpes simplex virus type 2 (HSV-2) infection affecting pregnancy in third trimester        2. History of gestational diabetes in prior pregnancy, currently pregnant in third trimester        3. Obesity affecting pregnancy in third trimester        4. Supervision of high-risk pregnancy with grand multiparity in third trimester        5. Unwanted fertility        6. Rubella non-immune status, antepartum        7. Gestational diabetes mellitus (GDM) in third trimester, gestational diabetes method of control unspecified  US fetal biophysical profile wo non stress testing      8. 33 weeks gestation of pregnancy          Melody Bal, APRN  8/17/2023  14:07 CDT

## 2023-08-24 ENCOUNTER — ROUTINE PRENATAL (OUTPATIENT)
Dept: OBSTETRICS AND GYNECOLOGY | Facility: CLINIC | Age: 33
End: 2023-08-24
Payer: COMMERCIAL

## 2023-08-24 VITALS — BODY MASS INDEX: 40.03 KG/M2 | DIASTOLIC BLOOD PRESSURE: 76 MMHG | WEIGHT: 233.2 LBS | SYSTOLIC BLOOD PRESSURE: 124 MMHG

## 2023-08-24 DIAGNOSIS — O09.93 SUPERVISION OF HIGH RISK PREGNANCY IN THIRD TRIMESTER: Primary | ICD-10-CM

## 2023-08-24 DIAGNOSIS — Z28.39 RUBELLA NON-IMMUNE STATUS, ANTEPARTUM: ICD-10-CM

## 2023-08-24 DIAGNOSIS — Z3A.34 34 WEEKS GESTATION OF PREGNANCY: ICD-10-CM

## 2023-08-24 DIAGNOSIS — Z86.32 HISTORY OF GESTATIONAL DIABETES IN PRIOR PREGNANCY, CURRENTLY PREGNANT IN THIRD TRIMESTER: ICD-10-CM

## 2023-08-24 DIAGNOSIS — B00.9 HERPES SIMPLEX VIRUS TYPE 2 (HSV-2) INFECTION AFFECTING PREGNANCY IN THIRD TRIMESTER: ICD-10-CM

## 2023-08-24 DIAGNOSIS — O09.899 RUBELLA NON-IMMUNE STATUS, ANTEPARTUM: ICD-10-CM

## 2023-08-24 DIAGNOSIS — Z64.1 GRAND MULTIPARITY: ICD-10-CM

## 2023-08-24 DIAGNOSIS — O09.293 HISTORY OF GESTATIONAL DIABETES IN PRIOR PREGNANCY, CURRENTLY PREGNANT IN THIRD TRIMESTER: ICD-10-CM

## 2023-08-24 DIAGNOSIS — O24.415 GESTATIONAL DIABETES MELLITUS (GDM) IN THIRD TRIMESTER CONTROLLED ON ORAL HYPOGLYCEMIC DRUG: ICD-10-CM

## 2023-08-24 DIAGNOSIS — O98.513 HERPES SIMPLEX VIRUS TYPE 2 (HSV-2) INFECTION AFFECTING PREGNANCY IN THIRD TRIMESTER: ICD-10-CM

## 2023-08-24 DIAGNOSIS — O99.213 OBESITY AFFECTING PREGNANCY IN THIRD TRIMESTER: ICD-10-CM

## 2023-08-24 DIAGNOSIS — Z30.09 UNWANTED FERTILITY: ICD-10-CM

## 2023-08-24 PROBLEM — O24.410 DIET CONTROLLED GESTATIONAL DIABETES MELLITUS (GDM) IN THIRD TRIMESTER: Status: ACTIVE | Noted: 2023-08-24

## 2023-08-24 RX ORDER — OXYTOCIN/0.9 % SODIUM CHLORIDE 30/500 ML
999 PLASTIC BAG, INJECTION (ML) INTRAVENOUS ONCE
OUTPATIENT
Start: 2023-08-24

## 2023-08-24 RX ORDER — SODIUM CHLORIDE 9 MG/ML
40 INJECTION, SOLUTION INTRAVENOUS AS NEEDED
OUTPATIENT
Start: 2023-08-24

## 2023-08-24 RX ORDER — CARBOPROST TROMETHAMINE 250 UG/ML
250 INJECTION, SOLUTION INTRAMUSCULAR AS NEEDED
OUTPATIENT
Start: 2023-08-24

## 2023-08-24 RX ORDER — LIDOCAINE HYDROCHLORIDE 10 MG/ML
0.5 INJECTION, SOLUTION INFILTRATION; PERINEURAL ONCE AS NEEDED
OUTPATIENT
Start: 2023-08-24

## 2023-08-24 RX ORDER — BUTORPHANOL TARTRATE 1 MG/ML
1 INJECTION, SOLUTION INTRAMUSCULAR; INTRAVENOUS
OUTPATIENT
Start: 2023-08-24

## 2023-08-24 RX ORDER — MISOPROSTOL 100 UG/1
800 TABLET ORAL AS NEEDED
OUTPATIENT
Start: 2023-08-24

## 2023-08-24 RX ORDER — BUTORPHANOL TARTRATE 1 MG/ML
2 INJECTION, SOLUTION INTRAMUSCULAR; INTRAVENOUS
OUTPATIENT
Start: 2023-08-24

## 2023-08-24 RX ORDER — ACETAMINOPHEN 325 MG/1
1000 TABLET ORAL EVERY 6 HOURS
OUTPATIENT
Start: 2023-08-24

## 2023-08-24 RX ORDER — MISOPROSTOL 100 MCG
50 TABLET ORAL EVERY 4 HOURS
OUTPATIENT
Start: 2023-08-24 | End: 2023-08-24

## 2023-08-24 RX ORDER — PROMETHAZINE HYDROCHLORIDE 25 MG/1
12.5 SUPPOSITORY RECTAL EVERY 6 HOURS PRN
OUTPATIENT
Start: 2023-08-24

## 2023-08-24 RX ORDER — VALACYCLOVIR HYDROCHLORIDE 500 MG/1
500 TABLET, FILM COATED ORAL 2 TIMES DAILY
Qty: 60 TABLET | Refills: 1 | Status: SHIPPED | OUTPATIENT
Start: 2023-08-24

## 2023-08-24 RX ORDER — OXYTOCIN/0.9 % SODIUM CHLORIDE 30/500 ML
250 PLASTIC BAG, INJECTION (ML) INTRAVENOUS CONTINUOUS
OUTPATIENT
Start: 2023-08-24 | End: 2023-08-24

## 2023-08-24 RX ORDER — ONDANSETRON 4 MG/1
4 TABLET, FILM COATED ORAL EVERY 6 HOURS PRN
OUTPATIENT
Start: 2023-08-24

## 2023-08-24 RX ORDER — SODIUM CHLORIDE 0.9 % (FLUSH) 0.9 %
10 SYRINGE (ML) INJECTION AS NEEDED
OUTPATIENT
Start: 2023-08-24

## 2023-08-24 RX ORDER — SODIUM CHLORIDE 0.9 % (FLUSH) 0.9 %
10 SYRINGE (ML) INJECTION EVERY 12 HOURS SCHEDULED
OUTPATIENT
Start: 2023-08-24

## 2023-08-24 RX ORDER — PROMETHAZINE HYDROCHLORIDE 25 MG/1
25 TABLET ORAL EVERY 6 HOURS PRN
OUTPATIENT
Start: 2023-08-24

## 2023-08-24 RX ORDER — ONDANSETRON 2 MG/ML
4 INJECTION INTRAMUSCULAR; INTRAVENOUS EVERY 6 HOURS PRN
OUTPATIENT
Start: 2023-08-24

## 2023-08-24 RX ORDER — METHYLERGONOVINE MALEATE 0.2 MG/ML
200 INJECTION INTRAVENOUS ONCE AS NEEDED
OUTPATIENT
Start: 2023-08-24

## 2023-08-24 RX ORDER — SODIUM CHLORIDE, SODIUM LACTATE, POTASSIUM CHLORIDE, CALCIUM CHLORIDE 600; 310; 30; 20 MG/100ML; MG/100ML; MG/100ML; MG/100ML
125 INJECTION, SOLUTION INTRAVENOUS CONTINUOUS
OUTPATIENT
Start: 2023-08-24

## 2023-08-24 RX ORDER — IBUPROFEN 200 MG
800 TABLET ORAL EVERY 8 HOURS
OUTPATIENT
Start: 2023-08-24

## 2023-08-24 NOTE — H&P (VIEW-ONLY)
[facility]  HISTORY & PHYSICAL - Obstetrics    Name: Carlyn Aguila  MRN: [id number]  Location: Room/bed info not found  Date: 2023   [facility]: [id number]      CHIEF COMPLAINT:  IOL at 38 wks secondary to GDMA2    HISTORY OF PRESENT ILLNESS  Carlyn Aguila is a 33 y.o.  at 34w0d who presents today for RPN.  She is scheduled for IOL at 38w0d secondary to GDMA2.  Today, denies LOF, vaginal bleeding, or contractions.  Reports good FM.    Patient denies any chest pain, palpitations, headaches, lightheadedness, shortness of breath, cough, nausea, vomiting, diarrhea, constipation, fever, or chills.    ROS  Review of Systems   Constitutional: Negative.    HENT: Negative.     Eyes: Negative.    Respiratory: Negative.     Cardiovascular: Negative.    Gastrointestinal: Negative.    Endocrine: Negative.    Genitourinary: Negative.    Musculoskeletal: Negative.    Skin: Negative.    Allergic/Immunologic: Negative.    Neurological: Negative.    Hematological: Negative.    Psychiatric/Behavioral: Negative.       PRENATAL LAB RESULTS  Prenatal labs reviewed  External Prenatal Results       Pregnancy Outside Results - Transcribed From Office Records - See Scanned Records For Details       Test Value Date Time    ABO  B  23    Rh  Positive  23    Antibody Screen  Negative  23    Varicella IgG       Rubella  <0.90 index 23    Hgb  9.9 g/dL 23 0905       12.3 g/dL 23 09    Hct  29.6 % 23 0905       36.8 % 23 09    Glucose Fasting GTT       Glucose Tolerance Test 1 hour       Glucose Tolerance Test 3 hour       Gonorrhea (discrete)  Negative  2314    Chlamydia (discrete)  Negative  23    RPR  Non-Reactive  23    VDRL       Syphilis Antibody       HBsAg  Non-Reactive  23    Herpes Simplex Virus PCR       Herpes Simplex VIrus Culture  Negative  18 1657    HIV  Non-Reactive   03/06/23 0914    Hep C RNA Quant PCR ^ NONREACTIVE  07/17/20 1518    Hep C Antibody  Non-Reactive  03/06/23 0914    AFP       Group B Strep  Negative  12/06/18 0923    GBS Susceptibility to Clindamycin       GBS Susceptibility to Erythromycin       Fetal Fibronectin       Genetic Testing, Maternal Blood                 Drug Screening       Test Value Date Time    Urine Drug Screen       Amphetamine Screen  Negative  03/06/23 0914    Barbiturate Screen  Negative  03/06/23 0914    Benzodiazepine Screen  Negative  03/06/23 0914    Methadone Screen  Negative  03/06/23 0914    Phencyclidine Screen  Negative  03/06/23 0914    Opiates Screen  Negative  03/06/23 0914    THC Screen  Negative  03/06/23 0914    Cocaine Screen       Propoxyphene Screen  Negative  03/06/23 0914    Buprenorphine Screen  Negative  03/06/23 0914    Methamphetamine Screen       Oxycodone Screen  Negative  03/06/23 0914    Tricyclic Antidepressants Screen  Negative  03/06/23 0914              Legend    ^: Historical                          PRENATAL RISK FACTORS  3/23 Problems (from 03/06/23 to present)       Problem Noted Resolved    Supervision of high risk pregnancy in third trimester 8/24/2023 by Chelsea Walsh MD No    Gestational diabetes mellitus (GDM) in third trimester controlled on oral hypoglycemic drug 8/24/2023 by Chelsea Walsh MD No    Overview Signed 8/24/2023  8:18 AM by Chelsea Walsh MD     Based on BS log         Grand multiparity 5/11/2023 by Chelsea Walsh MD No    Unwanted fertility 5/11/2023 by Chelsea Walsh MD No    Overview Signed 8/10/2023  4:09 PM by Melody Bal APRN     8/10- tubal papers signed.         Rubella non-immune status, antepartum 5/11/2023 by Chelsea Walsh MD No    Overview Signed 5/11/2023  8:55 AM by Chelsea Walsh MD     Needs MMR PP         History of gestational diabetes in prior pregnancy,  currently pregnant in third trimester 3/16/2023 by Melody Bal APRN No    Overview Signed 2023  8:03 AM by Chelsea Walsh MD     Early glucola WNL         Obesity affecting pregnancy in third trimester 3/16/2023 by Melody Bal APRN No    Herpes simplex virus type 2 (HSV-2) infection affecting pregnancy in third trimester 2020 by Teresita Mireles, RN No    Overview Addendum 3/16/2023  4:45 PM by Melody Bal APRN     Will need suppression at 36wks, does not want mentioned in front of anyone               OB HISTORY  OB History    Para Term  AB Living   8 6 6   1 6   SAB IAB Ectopic Molar Multiple Live Births   1         6      # Outcome Date GA Lbr Jai/2nd Weight Sex Delivery Anes PTL Lv   8 Current            7 Term 21 39w0d  4030 g (8 lb 14.2 oz) M Vag-Spont EPI N KAYLEEN      Complications: Gestational diabetes   6 Term 18 38w5d 15:31 / 01:11 4150 g (9 lb 2.4 oz) M Vag-Spont EPI N KAYLEEN      Complications: Failure to Progress in First Stage   5 SAB 2017 6w0d          4 Term 03/27/15 39w0d  3856 g (8 lb 8 oz) M Vag-Spont EPI  KAYLEEN      Birth Comments: IOL    3 Term 13 37w0d  3685 g (8 lb 2 oz) M Vag-Spont None Y KAYLEEN   2 Term 10/22/10 38w1d  3430 g (7 lb 9 oz) M Vag-Spont None  KAYLEEN      Birth Comments: 6-7 cm on arrival   1 Term 08 39w0d  4564 g (10 lb 1 oz) M Vag-Spont None N KAYLEEN      Birth Comments: Stadol and Phenergan used      Complications: Fetal macrosomia     PAST MEDICAL HISTORY  Past Medical History:   Diagnosis Date    Anxiety     Asthma     Gestational diabetes     Herpes     Migraine      PAST SURGICAL HISTORY  Past Surgical History:   Procedure Laterality Date    WISDOM TOOTH EXTRACTION       FAMILY HISTORY  Family History   Problem Relation Age of Onset    No Known Problems Mother     Cirrhosis Father     No Known Problems Brother     No Known Problems Son     No Known Problems Son     No Known Problems Son     No Known Problems  Son     No Known Problems Son     No Known Problems Maternal Grandmother     No Known Problems Maternal Grandfather     Alzheimer's disease Paternal Grandmother     Breast cancer Other      SOCIAL HISTORY  Social History     Socioeconomic History    Marital status:    Tobacco Use    Smoking status: Never    Smokeless tobacco: Never   Vaping Use    Vaping Use: Never used   Substance and Sexual Activity    Alcohol use: No    Drug use: No    Sexual activity: Yes     Partners: Male     Comment: LAST PAP NORMAL 7/17/20     ALLERGIES  Allergies   Allergen Reactions    Other Anaphylaxis     APPLES     HOME MEDICATIONS  Prior to Admission medications    Medication Sig Start Date End Date Taking? Authorizing Provider   Alcohol Swabs (Alcohol Pads) 70 % pads 1 pad 4 (Four) Times a Day. 8/10/23  Yes Melody Bal APRN   cetirizine (zyrTEC) 10 MG tablet Take 1 tablet by mouth Daily. 4/13/23  Yes Melody Bal APRN   docusate sodium (Colace) 100 MG capsule Take 1 capsule by mouth 2 (Two) Times a Day. 7/21/23  Yes Chelsea Walsh MD   ferrous sulfate 325 (65 FE) MG tablet Take 1 tablet by mouth 2 (Two) Times a Day. 7/20/23  Yes Chelsea Walsh MD   glucose blood test strip Test blood sugars 4 times per day as directed. 8/10/23  Yes Melody Bal APRN   glucose monitor monitoring kit 1 each As Needed (gestational diabetes testing). May substitute with preferred, available equivalent. 8/10/23  Yes Melody Bal APRN   Lancets (onetouch ultrasoft) lancets Test blood sugars 4 times per day as directed. 8/10/23  Yes Melody Bal APRN   ondansetron (Zofran) 8 MG tablet Take 1 tablet by mouth Every 8 (Eight) Hours As Needed for Nausea or Vomiting. 3/16/23  Yes Melody Bal APRN   valACYclovir (Valtrex) 1000 MG tablet Take 1 tablet by mouth Daily. 3/16/23 8/24/23 Yes Melody Bal APRN   metFORMIN (Glucophage) 500 MG tablet Take 2 tablets by mouth every night at bedtime. 8/24/23 8/23/24   Chelsea Walsh MD   valACYclovir (Valtrex) 500 MG tablet Take 1 tablet by mouth 2 (Two) Times a Day. 23   Chelsea Walsh MD     PHYSICAL EXAM  /76   Wt 106 kg (233 lb 3.2 oz)   LMP  (LMP Unknown)   BMI 40.03 kg/m²   General: No acute distress. Well developed, well nourished. Pleasant.  Heart: Regular rate and rhythm. No murmurs, rubs, or gallops  Lungs: Clear to auscultation bilaterally. No wheezes, rales, or rhonchi.  Abdomen: Soft, nontender to palpation, enlarged by gravid uterus.    IMPRESSION  Carlyn Aguila is a 33 y.o.  scheduled for IOL at 38w0d secondary to GDMA2.    PLAN  1.  IOL  - Admit: Labor and Delivery  - Attending: Dr. Walsh  - Condition: Stable  - Vitals: per protocol  - Activity: ad sarwat  - Nursing: Continuous electronic fetal monitoring, as per protocol  - Diet: Clears  - IV fluids:  mL/hr  - Meds: SL Cytotec  - Allergies: Other  - Labs: CBC, T&S, UDS  - GBS: unk.  Antibiotics: unk  - Carlyn Aguila and I have discussed pain goals for this hospitalization after reviewing her current clinical condition, medical history and prior pain experiences.  The goal is to keep her pain level appropriate.  Patient may have epidural if desired.  - Anticipate     This document has been electronically signed by Chelsea Walsh MD on 2023 08:45 CDT.

## 2023-08-24 NOTE — H&P
[facility]  HISTORY & PHYSICAL - Obstetrics    Name: Carlyn Aguila  MRN: [id number]  Location: Room/bed info not found  Date: 2023   [facility]: [id number]      CHIEF COMPLAINT:  IOL at 38 wks secondary to GDMA2    HISTORY OF PRESENT ILLNESS  Carlyn Aguila is a 33 y.o.  at 34w0d who presents today for RPN.  She is scheduled for IOL at 38w0d secondary to GDMA2.  Today, denies LOF, vaginal bleeding, or contractions.  Reports good FM.    Patient denies any chest pain, palpitations, headaches, lightheadedness, shortness of breath, cough, nausea, vomiting, diarrhea, constipation, fever, or chills.    ROS  Review of Systems   Constitutional: Negative.    HENT: Negative.     Eyes: Negative.    Respiratory: Negative.     Cardiovascular: Negative.    Gastrointestinal: Negative.    Endocrine: Negative.    Genitourinary: Negative.    Musculoskeletal: Negative.    Skin: Negative.    Allergic/Immunologic: Negative.    Neurological: Negative.    Hematological: Negative.    Psychiatric/Behavioral: Negative.       PRENATAL LAB RESULTS  Prenatal labs reviewed  External Prenatal Results       Pregnancy Outside Results - Transcribed From Office Records - See Scanned Records For Details       Test Value Date Time    ABO  B  23    Rh  Positive  23    Antibody Screen  Negative  23    Varicella IgG       Rubella  <0.90 index 23    Hgb  9.9 g/dL 23 0905       12.3 g/dL 23 09    Hct  29.6 % 23 0905       36.8 % 23 09    Glucose Fasting GTT       Glucose Tolerance Test 1 hour       Glucose Tolerance Test 3 hour       Gonorrhea (discrete)  Negative  2314    Chlamydia (discrete)  Negative  23    RPR  Non-Reactive  23    VDRL       Syphilis Antibody       HBsAg  Non-Reactive  23    Herpes Simplex Virus PCR       Herpes Simplex VIrus Culture  Negative  18 1657    HIV  Non-Reactive   03/06/23 0914    Hep C RNA Quant PCR ^ NONREACTIVE  07/17/20 1518    Hep C Antibody  Non-Reactive  03/06/23 0914    AFP       Group B Strep  Negative  12/06/18 0923    GBS Susceptibility to Clindamycin       GBS Susceptibility to Erythromycin       Fetal Fibronectin       Genetic Testing, Maternal Blood                 Drug Screening       Test Value Date Time    Urine Drug Screen       Amphetamine Screen  Negative  03/06/23 0914    Barbiturate Screen  Negative  03/06/23 0914    Benzodiazepine Screen  Negative  03/06/23 0914    Methadone Screen  Negative  03/06/23 0914    Phencyclidine Screen  Negative  03/06/23 0914    Opiates Screen  Negative  03/06/23 0914    THC Screen  Negative  03/06/23 0914    Cocaine Screen       Propoxyphene Screen  Negative  03/06/23 0914    Buprenorphine Screen  Negative  03/06/23 0914    Methamphetamine Screen       Oxycodone Screen  Negative  03/06/23 0914    Tricyclic Antidepressants Screen  Negative  03/06/23 0914              Legend    ^: Historical                          PRENATAL RISK FACTORS  3/23 Problems (from 03/06/23 to present)       Problem Noted Resolved    Supervision of high risk pregnancy in third trimester 8/24/2023 by Chelsea Walsh MD No    Gestational diabetes mellitus (GDM) in third trimester controlled on oral hypoglycemic drug 8/24/2023 by Chelsea Walsh MD No    Overview Signed 8/24/2023  8:18 AM by Chelsea Walsh MD     Based on BS log         Grand multiparity 5/11/2023 by Chelsea Walsh MD No    Unwanted fertility 5/11/2023 by Chelsea Walsh MD No    Overview Signed 8/10/2023  4:09 PM by Melody Bal APRN     8/10- tubal papers signed.         Rubella non-immune status, antepartum 5/11/2023 by Chelsea Walsh MD No    Overview Signed 5/11/2023  8:55 AM by Chelsea Walsh MD     Needs MMR PP         History of gestational diabetes in prior pregnancy,  currently pregnant in third trimester 3/16/2023 by Melody Bal APRN No    Overview Signed 2023  8:03 AM by Chelsea Walsh MD     Early glucola WNL         Obesity affecting pregnancy in third trimester 3/16/2023 by Melody Bal APRN No    Herpes simplex virus type 2 (HSV-2) infection affecting pregnancy in third trimester 2020 by Teresita Mireles, RN No    Overview Addendum 3/16/2023  4:45 PM by Melody Bal APRN     Will need suppression at 36wks, does not want mentioned in front of anyone               OB HISTORY  OB History    Para Term  AB Living   8 6 6   1 6   SAB IAB Ectopic Molar Multiple Live Births   1         6      # Outcome Date GA Lbr Jai/2nd Weight Sex Delivery Anes PTL Lv   8 Current            7 Term 21 39w0d  4030 g (8 lb 14.2 oz) M Vag-Spont EPI N KAYLEEN      Complications: Gestational diabetes   6 Term 18 38w5d 15:31 / 01:11 4150 g (9 lb 2.4 oz) M Vag-Spont EPI N KAYLEEN      Complications: Failure to Progress in First Stage   5 SAB 2017 6w0d          4 Term 03/27/15 39w0d  3856 g (8 lb 8 oz) M Vag-Spont EPI  KAYLEEN      Birth Comments: IOL    3 Term 13 37w0d  3685 g (8 lb 2 oz) M Vag-Spont None Y KAYLEEN   2 Term 10/22/10 38w1d  3430 g (7 lb 9 oz) M Vag-Spont None  KAYLEEN      Birth Comments: 6-7 cm on arrival   1 Term 08 39w0d  4564 g (10 lb 1 oz) M Vag-Spont None N KAYLEEN      Birth Comments: Stadol and Phenergan used      Complications: Fetal macrosomia     PAST MEDICAL HISTORY  Past Medical History:   Diagnosis Date    Anxiety     Asthma     Gestational diabetes     Herpes     Migraine      PAST SURGICAL HISTORY  Past Surgical History:   Procedure Laterality Date    WISDOM TOOTH EXTRACTION       FAMILY HISTORY  Family History   Problem Relation Age of Onset    No Known Problems Mother     Cirrhosis Father     No Known Problems Brother     No Known Problems Son     No Known Problems Son     No Known Problems Son     No Known Problems  Son     No Known Problems Son     No Known Problems Maternal Grandmother     No Known Problems Maternal Grandfather     Alzheimer's disease Paternal Grandmother     Breast cancer Other      SOCIAL HISTORY  Social History     Socioeconomic History    Marital status:    Tobacco Use    Smoking status: Never    Smokeless tobacco: Never   Vaping Use    Vaping Use: Never used   Substance and Sexual Activity    Alcohol use: No    Drug use: No    Sexual activity: Yes     Partners: Male     Comment: LAST PAP NORMAL 7/17/20     ALLERGIES  Allergies   Allergen Reactions    Other Anaphylaxis     APPLES     HOME MEDICATIONS  Prior to Admission medications    Medication Sig Start Date End Date Taking? Authorizing Provider   Alcohol Swabs (Alcohol Pads) 70 % pads 1 pad 4 (Four) Times a Day. 8/10/23  Yes Melody aBl APRN   cetirizine (zyrTEC) 10 MG tablet Take 1 tablet by mouth Daily. 4/13/23  Yes Melody Bal APRN   docusate sodium (Colace) 100 MG capsule Take 1 capsule by mouth 2 (Two) Times a Day. 7/21/23  Yes Chelsea Walsh MD   ferrous sulfate 325 (65 FE) MG tablet Take 1 tablet by mouth 2 (Two) Times a Day. 7/20/23  Yes Chelsea Walsh MD   glucose blood test strip Test blood sugars 4 times per day as directed. 8/10/23  Yes Melody Bal APRN   glucose monitor monitoring kit 1 each As Needed (gestational diabetes testing). May substitute with preferred, available equivalent. 8/10/23  Yes Melody Bal APRN   Lancets (onetouch ultrasoft) lancets Test blood sugars 4 times per day as directed. 8/10/23  Yes Melody Bal APRN   ondansetron (Zofran) 8 MG tablet Take 1 tablet by mouth Every 8 (Eight) Hours As Needed for Nausea or Vomiting. 3/16/23  Yes Melody Bal APRN   valACYclovir (Valtrex) 1000 MG tablet Take 1 tablet by mouth Daily. 3/16/23 8/24/23 Yes Melody Bal APRN   metFORMIN (Glucophage) 500 MG tablet Take 2 tablets by mouth every night at bedtime. 8/24/23 8/23/24   Chelsea Walsh MD   valACYclovir (Valtrex) 500 MG tablet Take 1 tablet by mouth 2 (Two) Times a Day. 23   Chelsea Walsh MD     PHYSICAL EXAM  /76   Wt 106 kg (233 lb 3.2 oz)   LMP  (LMP Unknown)   BMI 40.03 kg/mý   General: No acute distress. Well developed, well nourished. Pleasant.  Heart: Regular rate and rhythm. No murmurs, rubs, or gallops  Lungs: Clear to auscultation bilaterally. No wheezes, rales, or rhonchi.  Abdomen: Soft, nontender to palpation, enlarged by gravid uterus.    IMPRESSION  Carlyn Augila is a 33 y.o.  scheduled for IOL at 38w0d secondary to GDMA2.    PLAN  1.  IOL  - Admit: Labor and Delivery  - Attending: Dr. Walsh  - Condition: Stable  - Vitals: per protocol  - Activity: ad sarwat  - Nursing: Continuous electronic fetal monitoring, as per protocol  - Diet: Clears  - IV fluids:  mL/hr  - Meds: SL Cytotec  - Allergies: Other  - Labs: CBC, T&S, UDS  - GBS: unk.  Antibiotics: unk  - Carlyn Aguila and I have discussed pain goals for this hospitalization after reviewing her current clinical condition, medical history and prior pain experiences.  The goal is to keep her pain level appropriate.  Patient may have epidural if desired.  - Anticipate     This document has been electronically signed by Chelsea Walsh MD on 2023 08:45 CDT.

## 2023-08-24 NOTE — PROGRESS NOTES
CC: Prenatal visit    Carlyn Aguila is a 33 y.o.  at 34w0d.  Doing well.  Denies contractions, LOF, or VB.  Reports good FM.  Reports fasting BS 100s, 1h PP normally normal.    /76   Wt 106 kg (233 lb 3.2 oz)   LMP  (LMP Unknown)   BMI 40.03 kg/mý      Fetal Heart Rate: 149    Prelim US- BPP , MAYNOR 13.3 cm, cephalic, placenta right anterior    3/23 Problems (from 23 to present)       Problem Noted Resolved    Supervision of high risk pregnancy in third trimester 2023 by Chelsea Walsh MD No    Gestational diabetes mellitus (GDM) in third trimester controlled on oral hypoglycemic drug 2023 by Chelsea Walsh MD No    Overview Signed 2023  8:18 AM by Chelsea Walsh MD     Based on BS log         Grand multiparity 2023 by Chelsea Walsh MD No    Unwanted fertility 2023 by Chelsea Walsh MD No    Overview Signed 8/10/2023  4:09 PM by Melody Bal APRN     8/10- tubal papers signed.         Rubella non-immune status, antepartum 2023 by Chelsea Walsh MD No    Overview Signed 2023  8:55 AM by Chelsea Walsh MD     Needs MMR PP         History of gestational diabetes in prior pregnancy, currently pregnant in third trimester 3/16/2023 by Melody Bal APRN No    Overview Signed 2023  8:03 AM by Chelsea Walsh MD     Early glucola WNL         Obesity affecting pregnancy in third trimester 3/16/2023 by Melody Bal APRN No    Herpes simplex virus type 2 (HSV-2) infection affecting pregnancy in third trimester 2020 by Teresita Mireles, RN No    Overview Addendum 3/16/2023  4:45 PM by Melody Bal APRN     Will need suppression at 36wks, does not want mentioned in front of anyone                 A/P: Carlyn Aguila is a 33 y.o.  at 34w0d.  - RTC in 1 week w/ BPP  - RTC in 2 weeks w/ BPP+GS, GBS  - RTC in 3  weeks w/ BPP  - Start Valtrex suppression  - Start metformin qhs  - IOL at 38 wks scheduled for GDMA2     Diagnosis Plan   1. Supervision of high risk pregnancy in third trimester        2. Herpes simplex virus type 2 (HSV-2) infection affecting pregnancy in third trimester  valACYclovir (Valtrex) 500 MG tablet      3. History of gestational diabetes in prior pregnancy, currently pregnant in third trimester        4. Obesity affecting pregnancy in third trimester        5. Grand multiparity        6. Unwanted fertility        7. Rubella non-immune status, antepartum        8. Gestational diabetes mellitus (GDM) in third trimester controlled on oral hypoglycemic drug  US Fetal Biophysical Profile;Without Non-Stress Testing    US ob follow up transabdominal approach    metFORMIN (Glucophage) 500 MG tablet      9. 34 weeks gestation of pregnancy          Chelsea Walsh MD  8/24/2023  08:42 CDT

## 2023-08-31 ENCOUNTER — ROUTINE PRENATAL (OUTPATIENT)
Dept: OBSTETRICS AND GYNECOLOGY | Facility: CLINIC | Age: 33
End: 2023-08-31
Payer: COMMERCIAL

## 2023-08-31 VITALS — BODY MASS INDEX: 39.48 KG/M2 | DIASTOLIC BLOOD PRESSURE: 64 MMHG | WEIGHT: 230 LBS | SYSTOLIC BLOOD PRESSURE: 118 MMHG

## 2023-08-31 DIAGNOSIS — B00.9 HERPES SIMPLEX VIRUS TYPE 2 (HSV-2) INFECTION AFFECTING PREGNANCY IN THIRD TRIMESTER: ICD-10-CM

## 2023-08-31 DIAGNOSIS — Z3A.35 35 WEEKS GESTATION OF PREGNANCY: Primary | ICD-10-CM

## 2023-08-31 DIAGNOSIS — Z30.09 UNWANTED FERTILITY: ICD-10-CM

## 2023-08-31 DIAGNOSIS — O09.899 RUBELLA NON-IMMUNE STATUS, ANTEPARTUM: ICD-10-CM

## 2023-08-31 DIAGNOSIS — O09.93 SUPERVISION OF HIGH RISK PREGNANCY IN THIRD TRIMESTER: ICD-10-CM

## 2023-08-31 DIAGNOSIS — O09.293 HISTORY OF GESTATIONAL DIABETES IN PRIOR PREGNANCY, CURRENTLY PREGNANT IN THIRD TRIMESTER: ICD-10-CM

## 2023-08-31 DIAGNOSIS — Z86.32 HISTORY OF GESTATIONAL DIABETES IN PRIOR PREGNANCY, CURRENTLY PREGNANT IN THIRD TRIMESTER: ICD-10-CM

## 2023-08-31 DIAGNOSIS — O24.415 GESTATIONAL DIABETES MELLITUS (GDM) IN THIRD TRIMESTER CONTROLLED ON ORAL HYPOGLYCEMIC DRUG: ICD-10-CM

## 2023-08-31 DIAGNOSIS — Z28.39 RUBELLA NON-IMMUNE STATUS, ANTEPARTUM: ICD-10-CM

## 2023-08-31 DIAGNOSIS — Z64.1 GRAND MULTIPARITY: ICD-10-CM

## 2023-08-31 DIAGNOSIS — O98.513 HERPES SIMPLEX VIRUS TYPE 2 (HSV-2) INFECTION AFFECTING PREGNANCY IN THIRD TRIMESTER: ICD-10-CM

## 2023-08-31 DIAGNOSIS — O99.213 OBESITY AFFECTING PREGNANCY IN THIRD TRIMESTER: ICD-10-CM

## 2023-08-31 PROCEDURE — 87653 STREP B DNA AMP PROBE: CPT

## 2023-08-31 NOTE — PROGRESS NOTES
CC: Prenatal visit    Carlyn Aguila is a 33 y.o.  at 35w0d.  Doing well.  Denies contractions, LOF, or VB.  Reports good FM.    Prelim US: Cephalic, MAYNOR: 21.58cm, MVP: 7.09cm, Rt anterior placenta. BPP .     Discussed in private about pt herpes and need to start suppression therapy. States she was prescribed that last week and took her first dose today.     /64   Wt 104 kg (230 lb)   LMP  (LMP Unknown)   BMI 39.48 kg/mý   SVE: 1/40%/-3     Fetal Heart Rate: 135    3/23 Problems (from 23 to present)       Problem Noted Resolved    Supervision of high risk pregnancy in third trimester 2023 by Chelsea Walsh MD No    Gestational diabetes mellitus (GDM) in third trimester controlled on oral hypoglycemic drug 2023 by Chelsea Walsh MD No    Overview Signed 2023  8:18 AM by Chelsea Walsh MD     Based on BS log         Grand multiparity 2023 by Chelsea Walsh MD No    Unwanted fertility 2023 by Chelsea Walsh MD No    Overview Signed 8/10/2023  4:09 PM by Melody Bal APRN     8/10- tubal papers signed.         Rubella non-immune status, antepartum 2023 by Chelsea Walsh MD No    Overview Signed 2023  8:55 AM by Chelsea Walsh MD     Needs MMR PP         History of gestational diabetes in prior pregnancy, currently pregnant in third trimester 3/16/2023 by Melody Bal, APRN No    Overview Signed 2023  8:03 AM by Chelsea Walsh MD     Early glucola WNL         Obesity affecting pregnancy in third trimester 3/16/2023 by Melody Bal, APRN No    Herpes simplex virus type 2 (HSV-2) infection affecting pregnancy in third trimester 2020 by Teresita Mireles, RN No    Overview Addendum 3/16/2023  4:45 PM by Melody Bal APRN     Will need suppression at 36wks, does not want mentioned in front of anyone                 A/P:  Carlyn Aguila is a 33 y.o.  at 35w0d.  - RTC in 1 weeks with growth scan for GDM   - GBS collected today  - Started Valtrex suppression therapy      Diagnosis Plan   1. 35 weeks gestation of pregnancy  Group B Strep (Molecular) - Swab, Vaginal/Rectum    Group B Strep (Molecular) - Swab, Vaginal/Rectum      2. Herpes simplex virus type 2 (HSV-2) infection affecting pregnancy in third trimester        3. History of gestational diabetes in prior pregnancy, currently pregnant in third trimester        4. Obesity affecting pregnancy in third trimester        5. Grand multiparity        6. Unwanted fertility        7. Rubella non-immune status, antepartum        8. Supervision of high risk pregnancy in third trimester        9. Gestational diabetes mellitus (GDM) in third trimester controlled on oral hypoglycemic drug  US ob follow up transabdominal approach        JI Humphries  2023  11:55 CDT

## 2023-09-01 LAB — GROUP B STREP, DNA: NEGATIVE

## 2023-09-07 ENCOUNTER — ROUTINE PRENATAL (OUTPATIENT)
Dept: OBSTETRICS AND GYNECOLOGY | Facility: CLINIC | Age: 33
End: 2023-09-07
Payer: COMMERCIAL

## 2023-09-07 VITALS — SYSTOLIC BLOOD PRESSURE: 116 MMHG | BODY MASS INDEX: 40.17 KG/M2 | WEIGHT: 234 LBS | DIASTOLIC BLOOD PRESSURE: 74 MMHG

## 2023-09-07 DIAGNOSIS — B00.9 HERPES SIMPLEX VIRUS TYPE 2 (HSV-2) INFECTION AFFECTING PREGNANCY IN THIRD TRIMESTER: ICD-10-CM

## 2023-09-07 DIAGNOSIS — Z86.32 HISTORY OF GESTATIONAL DIABETES IN PRIOR PREGNANCY, CURRENTLY PREGNANT IN THIRD TRIMESTER: ICD-10-CM

## 2023-09-07 DIAGNOSIS — O99.213 OBESITY AFFECTING PREGNANCY IN THIRD TRIMESTER: ICD-10-CM

## 2023-09-07 DIAGNOSIS — Z64.1 GRAND MULTIPARITY: ICD-10-CM

## 2023-09-07 DIAGNOSIS — O09.93 SUPERVISION OF HIGH RISK PREGNANCY IN THIRD TRIMESTER: ICD-10-CM

## 2023-09-07 DIAGNOSIS — O09.293 HISTORY OF GESTATIONAL DIABETES IN PRIOR PREGNANCY, CURRENTLY PREGNANT IN THIRD TRIMESTER: ICD-10-CM

## 2023-09-07 DIAGNOSIS — Z3A.36 36 WEEKS GESTATION OF PREGNANCY: Primary | ICD-10-CM

## 2023-09-07 DIAGNOSIS — O98.513 HERPES SIMPLEX VIRUS TYPE 2 (HSV-2) INFECTION AFFECTING PREGNANCY IN THIRD TRIMESTER: ICD-10-CM

## 2023-09-07 DIAGNOSIS — O24.415 GESTATIONAL DIABETES MELLITUS (GDM) IN THIRD TRIMESTER CONTROLLED ON ORAL HYPOGLYCEMIC DRUG: ICD-10-CM

## 2023-09-07 DIAGNOSIS — Z28.39 RUBELLA NON-IMMUNE STATUS, ANTEPARTUM: ICD-10-CM

## 2023-09-07 DIAGNOSIS — Z30.09 UNWANTED FERTILITY: ICD-10-CM

## 2023-09-07 DIAGNOSIS — O09.899 RUBELLA NON-IMMUNE STATUS, ANTEPARTUM: ICD-10-CM

## 2023-09-07 PROCEDURE — 99214 OFFICE O/P EST MOD 30 MIN: CPT

## 2023-09-07 NOTE — PROGRESS NOTES
CC: Prenatal visit    Carlyn Aguila is a 33 y.o.  at 36w0d.  Doing well.  Denies contractions, LOF, or VB.  Reports good FM.  She has noticed more contractions and pelvic pressure.     Glucose: fastings 103-110  1hr pp meals 120-140's  Currently on 1000 mg Metformin.     Prelim US: Cephalic, MAYNOR: 19.38cm, MVP: 8.08cm. placenta anterior. BPD: 939.%, HC: 68.8%, AC: 94.1%, FL: 71.1%, EFW: 3253g 89.1%, BPP     LMP  (LMP Unknown)   SVE: 1/40%/-3     Fetal Heart Rate: 137    3/23 Problems (from 23 to present)       Problem Noted Resolved    Supervision of high risk pregnancy in third trimester 2023 by Chelsea Walsh MD No    Gestational diabetes mellitus (GDM) in third trimester controlled on oral hypoglycemic drug 2023 by Chelsea Walsh MD No    Overview Signed 2023  8:18 AM by Chelsea Walsh MD     Based on BS log         Grand multiparity 2023 by Chelsea Walsh MD No    Unwanted fertility 2023 by Chelsea Walsh MD No    Overview Signed 8/10/2023  4:09 PM by Melody Bal APRN     8/10- tubal papers signed.         Rubella non-immune status, antepartum 2023 by Chelsea Walsh MD No    Overview Signed 2023  8:55 AM by Chelsea Walsh MD     Needs MMR PP         History of gestational diabetes in prior pregnancy, currently pregnant in third trimester 3/16/2023 by Melody Bal, JI No    Overview Signed 2023  8:03 AM by Chelsea Walsh MD     Early glucola WNL         Obesity affecting pregnancy in third trimester 3/16/2023 by Melody Bal, APRN No    Herpes simplex virus type 2 (HSV-2) infection affecting pregnancy in third trimester 2020 by Teresita Mireles, RN No    Overview Addendum 3/16/2023  4:45 PM by Melody Bal APRN     Will need suppression at 36wks, does not want mentioned in front of anyone                 A/P:  Carlyn Aguila is a 33 y.o.  at 36w0d.  - RTC in 1 weeks with BPP for GDM  - Offered Vag panel. Pt declined   - Continue valtrex      Diagnosis Plan   1. 36 weeks gestation of pregnancy        2. Herpes simplex virus type 2 (HSV-2) infection affecting pregnancy in third trimester        3. History of gestational diabetes in prior pregnancy, currently pregnant in third trimester        4. Obesity affecting pregnancy in third trimester        5. Grand multiparity        6. Unwanted fertility        7. Rubella non-immune status, antepartum        8. Supervision of high risk pregnancy in third trimester        9. Gestational diabetes mellitus (GDM) in third trimester controlled on oral hypoglycemic drug          JI Humphries  2023  08:57 CDT

## 2023-09-14 ENCOUNTER — ROUTINE PRENATAL (OUTPATIENT)
Dept: OBSTETRICS AND GYNECOLOGY | Facility: CLINIC | Age: 33
End: 2023-09-14
Payer: COMMERCIAL

## 2023-09-14 VITALS — DIASTOLIC BLOOD PRESSURE: 64 MMHG | SYSTOLIC BLOOD PRESSURE: 116 MMHG | BODY MASS INDEX: 40.34 KG/M2 | WEIGHT: 235 LBS

## 2023-09-14 DIAGNOSIS — Z28.39 RUBELLA NON-IMMUNE STATUS, ANTEPARTUM: ICD-10-CM

## 2023-09-14 DIAGNOSIS — O99.213 OBESITY AFFECTING PREGNANCY IN THIRD TRIMESTER: ICD-10-CM

## 2023-09-14 DIAGNOSIS — O98.513 HERPES SIMPLEX VIRUS TYPE 2 (HSV-2) INFECTION AFFECTING PREGNANCY IN THIRD TRIMESTER: ICD-10-CM

## 2023-09-14 DIAGNOSIS — Z3A.37 37 WEEKS GESTATION OF PREGNANCY: Primary | ICD-10-CM

## 2023-09-14 DIAGNOSIS — B00.9 HERPES SIMPLEX VIRUS TYPE 2 (HSV-2) INFECTION AFFECTING PREGNANCY IN THIRD TRIMESTER: ICD-10-CM

## 2023-09-14 DIAGNOSIS — Z64.1 GRAND MULTIPARITY: ICD-10-CM

## 2023-09-14 DIAGNOSIS — O24.415 GESTATIONAL DIABETES MELLITUS (GDM) IN THIRD TRIMESTER CONTROLLED ON ORAL HYPOGLYCEMIC DRUG: ICD-10-CM

## 2023-09-14 DIAGNOSIS — O09.93 SUPERVISION OF HIGH RISK PREGNANCY IN THIRD TRIMESTER: ICD-10-CM

## 2023-09-14 DIAGNOSIS — Z86.32 HISTORY OF GESTATIONAL DIABETES IN PRIOR PREGNANCY, CURRENTLY PREGNANT IN THIRD TRIMESTER: ICD-10-CM

## 2023-09-14 DIAGNOSIS — Z30.09 UNWANTED FERTILITY: ICD-10-CM

## 2023-09-14 DIAGNOSIS — O09.293 HISTORY OF GESTATIONAL DIABETES IN PRIOR PREGNANCY, CURRENTLY PREGNANT IN THIRD TRIMESTER: ICD-10-CM

## 2023-09-14 DIAGNOSIS — O09.899 RUBELLA NON-IMMUNE STATUS, ANTEPARTUM: ICD-10-CM

## 2023-09-14 PROCEDURE — 99214 OFFICE O/P EST MOD 30 MIN: CPT | Performed by: NURSE PRACTITIONER

## 2023-09-14 NOTE — PROGRESS NOTES
CC: Prenatal visit    Carlyn Aguila is a 33 y.o.  at 37w0d.  Doing well.  No complaints.  She does not have BG log, but reports values are about the same.  She reports fasting staying 110-118, but 1 hour postprandials mostly controlled with occasional 140 or 160 reading.  She hasn't checked BG in the past couple of days because she ran out of testing strips.  Encouraged pt to call pharmacy because she has refills there.  Denies contractions, LOF, or VB.  Reports good FM.    /64   Wt 107 kg (235 lb)   LMP  (LMP Unknown)   BMI 40.34 kg/m²   SVE: /-3      US preliminary- fetus cephalic, MAYNOR 17.32 cm, MVP 8.07 cm, placenta right anterior, BPP 8/8    3/23 Problems (from 23 to present)       Problem Noted Resolved    Supervision of high risk pregnancy in third trimester 2023 by Chelsea Walsh MD No    Gestational diabetes mellitus (GDM) in third trimester controlled on oral hypoglycemic drug 2023 by Chelsea Walsh MD No    Overview Signed 2023  8:18 AM by Chelsea Walsh MD     Based on BS log         Grand multiparity 2023 by Chelsea Walsh MD No    Unwanted fertility 2023 by Chelsea Walsh MD No    Overview Signed 8/10/2023  4:09 PM by Melody Bal APRN     8/10- tubal papers signed.         Rubella non-immune status, antepartum 2023 by Chelsea Walsh MD No    Overview Signed 2023  8:55 AM by Chelsea Walsh MD     Needs MMR PP         History of gestational diabetes in prior pregnancy, currently pregnant in third trimester 3/16/2023 by Melody Bal APRN No    Overview Signed 2023  8:03 AM by Chelsea Walsh MD     Early glucola WNL         Obesity affecting pregnancy in third trimester 3/16/2023 by Melody Bal APRN No    Herpes simplex virus type 2 (HSV-2) infection affecting pregnancy in third trimester 2020 by  Teresita Mireles, RN No    Overview Addendum 3/16/2023  4:45 PM by Melody Bal APRN     Will need suppression at 36wks, does not want mentioned in front of anyone                 A/P: Carlyn Aguila is a 33 y.o.  at 37w0d.  OB admission packet given  - RT L/D for IOL on 2023     Diagnosis Plan   1. 37 weeks gestation of pregnancy        2. Supervision of high risk pregnancy in third trimester        3. Herpes simplex virus type 2 (HSV-2) infection affecting pregnancy in third trimester        4. History of gestational diabetes in prior pregnancy, currently pregnant in third trimester        5. Obesity affecting pregnancy in third trimester        6. Grand multiparity        7. Unwanted fertility        8. Rubella non-immune status, antepartum        9. Gestational diabetes mellitus (GDM) in third trimester controlled on oral hypoglycemic drug  Metformin 1500mg pm along with protein snack          JI Aguilar  2023  13:37 CDT

## 2023-09-21 ENCOUNTER — ANESTHESIA EVENT (OUTPATIENT)
Dept: LABOR AND DELIVERY | Facility: HOSPITAL | Age: 33
End: 2023-09-21
Payer: COMMERCIAL

## 2023-09-21 ENCOUNTER — HOSPITAL ENCOUNTER (INPATIENT)
Facility: HOSPITAL | Age: 33
LOS: 2 days | Discharge: HOME | End: 2023-09-23
Attending: OBSTETRICS & GYNECOLOGY | Admitting: OBSTETRICS & GYNECOLOGY
Payer: COMMERCIAL

## 2023-09-21 ENCOUNTER — ANESTHESIA (OUTPATIENT)
Dept: LABOR AND DELIVERY | Facility: HOSPITAL | Age: 33
End: 2023-09-21
Payer: COMMERCIAL

## 2023-09-21 DIAGNOSIS — M54.12 CERVICAL RADICULOPATHY: Primary | ICD-10-CM

## 2023-09-21 DIAGNOSIS — O24.415 GESTATIONAL DIABETES MELLITUS (GDM) IN THIRD TRIMESTER CONTROLLED ON ORAL HYPOGLYCEMIC DRUG: ICD-10-CM

## 2023-09-21 LAB
ABO GROUP BLD: NORMAL
AMPHET+METHAMPHET UR QL: NEGATIVE
AMPHETAMINES UR QL: NEGATIVE
ANISOCYTOSIS BLD QL: ABNORMAL
BARBITURATES UR QL SCN: NEGATIVE
BASOPHILS # BLD MANUAL: 0.11 10*3/MM3 (ref 0–0.2)
BASOPHILS NFR BLD MANUAL: 1 % (ref 0–1.5)
BENZODIAZ UR QL SCN: NEGATIVE
BLD GP AB SCN SERPL QL: NEGATIVE
BUPRENORPHINE SERPL-MCNC: NEGATIVE NG/ML
CANNABINOIDS SERPL QL: NEGATIVE
COCAINE UR QL: NEGATIVE
DEPRECATED RDW RBC AUTO: 50.8 FL (ref 37–54)
DEPRECATED RDW RBC AUTO: 50.9 FL (ref 37–54)
EOSINOPHIL # BLD MANUAL: 0.11 10*3/MM3 (ref 0–0.4)
EOSINOPHIL NFR BLD MANUAL: 1 % (ref 0.3–6.2)
ERYTHROCYTE [DISTWIDTH] IN BLOOD BY AUTOMATED COUNT: 20.2 % (ref 12.3–15.4)
ERYTHROCYTE [DISTWIDTH] IN BLOOD BY AUTOMATED COUNT: 20.4 % (ref 12.3–15.4)
FENTANYL UR-MCNC: NEGATIVE NG/ML
GEN 5 2HR TROPONIN T REFLEX: <6 NG/L
GLUCOSE BLDC GLUCOMTR-MCNC: 140 MG/DL (ref 70–130)
HCT VFR BLD AUTO: 32.8 % (ref 34–46.6)
HCT VFR BLD AUTO: 34.6 % (ref 34–46.6)
HGB BLD-MCNC: 10.8 G/DL (ref 12–15.9)
HGB BLD-MCNC: 11.3 G/DL (ref 12–15.9)
HOLD SPECIMEN: NORMAL
HYPOCHROMIA BLD QL: ABNORMAL
LYMPHOCYTES # BLD MANUAL: 1.45 10*3/MM3 (ref 0.7–3.1)
LYMPHOCYTES NFR BLD MANUAL: 7 % (ref 5–12)
Lab: NORMAL
MCH RBC QN AUTO: 23.3 PG (ref 26.6–33)
MCH RBC QN AUTO: 23.5 PG (ref 26.6–33)
MCHC RBC AUTO-ENTMCNC: 32.7 G/DL (ref 31.5–35.7)
MCHC RBC AUTO-ENTMCNC: 32.9 G/DL (ref 31.5–35.7)
MCV RBC AUTO: 71.3 FL (ref 79–97)
MCV RBC AUTO: 71.5 FL (ref 79–97)
METHADONE UR QL SCN: NEGATIVE
MONOCYTES # BLD: 0.78 10*3/MM3 (ref 0.1–0.9)
NEUTROPHILS # BLD AUTO: 8.7 10*3/MM3 (ref 1.7–7)
NEUTROPHILS NFR BLD MANUAL: 71 % (ref 42.7–76)
NEUTS BAND NFR BLD MANUAL: 7 % (ref 0–5)
OPIATES UR QL: NEGATIVE
OXYCODONE UR QL SCN: NEGATIVE
PCP UR QL SCN: NEGATIVE
PLAT MORPH BLD: NORMAL
PLATELET # BLD AUTO: 406 10*3/MM3 (ref 140–450)
PLATELET # BLD AUTO: 417 10*3/MM3 (ref 140–450)
PMV BLD AUTO: 8.4 FL (ref 6–12)
PMV BLD AUTO: 8.8 FL (ref 6–12)
PROPOXYPH UR QL: NEGATIVE
QT INTERVAL: 346 MS
QTC INTERVAL: 441 MS
RBC # BLD AUTO: 4.59 10*6/MM3 (ref 3.77–5.28)
RBC # BLD AUTO: 4.85 10*6/MM3 (ref 3.77–5.28)
RH BLD: POSITIVE
T&S EXPIRATION DATE: NORMAL
TRICYCLICS UR QL SCN: NEGATIVE
TROPONIN T DELTA: NORMAL
TROPONIN T SERPL HS-MCNC: <6 NG/L
VARIANT LYMPHS NFR BLD MANUAL: 1 % (ref 0–5)
VARIANT LYMPHS NFR BLD MANUAL: 12 % (ref 19.6–45.3)
WBC MORPH BLD: NORMAL
WBC NRBC COR # BLD: 11.16 10*3/MM3 (ref 3.4–10.8)
WBC NRBC COR # BLD: 7.29 10*3/MM3 (ref 3.4–10.8)

## 2023-09-21 PROCEDURE — 86850 RBC ANTIBODY SCREEN: CPT | Performed by: OBSTETRICS & GYNECOLOGY

## 2023-09-21 PROCEDURE — 51703 INSERT BLADDER CATH COMPLEX: CPT

## 2023-09-21 PROCEDURE — 10907ZC DRAINAGE OF AMNIOTIC FLUID, THERAPEUTIC FROM PRODUCTS OF CONCEPTION, VIA NATURAL OR ARTIFICIAL OPENING: ICD-10-PCS | Performed by: OBSTETRICS & GYNECOLOGY

## 2023-09-21 PROCEDURE — 93010 ELECTROCARDIOGRAM REPORT: CPT | Performed by: INTERNAL MEDICINE

## 2023-09-21 PROCEDURE — 80307 DRUG TEST PRSMV CHEM ANLYZR: CPT | Performed by: OBSTETRICS & GYNECOLOGY

## 2023-09-21 PROCEDURE — C1755 CATHETER, INTRASPINAL: HCPCS

## 2023-09-21 PROCEDURE — C1755 CATHETER, INTRASPINAL: HCPCS | Performed by: NURSE ANESTHETIST, CERTIFIED REGISTERED

## 2023-09-21 PROCEDURE — 25010000002 BUPIVACAINE (PF) 0.25 % SOLUTION: Performed by: NURSE ANESTHETIST, CERTIFIED REGISTERED

## 2023-09-21 PROCEDURE — 25010000002 FENTANYL CITRATE (PF) 250 MCG/5ML SOLUTION: Performed by: NURSE ANESTHETIST, CERTIFIED REGISTERED

## 2023-09-21 PROCEDURE — 85027 COMPLETE CBC AUTOMATED: CPT | Performed by: OBSTETRICS & GYNECOLOGY

## 2023-09-21 PROCEDURE — 82948 REAGENT STRIP/BLOOD GLUCOSE: CPT

## 2023-09-21 PROCEDURE — 93005 ELECTROCARDIOGRAM TRACING: CPT | Performed by: OBSTETRICS & GYNECOLOGY

## 2023-09-21 PROCEDURE — 85007 BL SMEAR W/DIFF WBC COUNT: CPT | Performed by: OBSTETRICS & GYNECOLOGY

## 2023-09-21 PROCEDURE — 25010000002 TERBUTALINE PER 1 MG: Performed by: OBSTETRICS & GYNECOLOGY

## 2023-09-21 PROCEDURE — 84484 ASSAY OF TROPONIN QUANT: CPT | Performed by: OBSTETRICS & GYNECOLOGY

## 2023-09-21 PROCEDURE — 88307 TISSUE EXAM BY PATHOLOGIST: CPT

## 2023-09-21 PROCEDURE — 59410 OBSTETRICAL CARE: CPT | Performed by: OBSTETRICS & GYNECOLOGY

## 2023-09-21 PROCEDURE — 86901 BLOOD TYPING SEROLOGIC RH(D): CPT | Performed by: OBSTETRICS & GYNECOLOGY

## 2023-09-21 PROCEDURE — 86900 BLOOD TYPING SEROLOGIC ABO: CPT | Performed by: OBSTETRICS & GYNECOLOGY

## 2023-09-21 RX ORDER — ONDANSETRON 4 MG/1
4 TABLET, FILM COATED ORAL EVERY 6 HOURS PRN
Status: DISCONTINUED | OUTPATIENT
Start: 2023-09-21 | End: 2023-09-21 | Stop reason: HOSPADM

## 2023-09-21 RX ORDER — SODIUM CHLORIDE 0.9 % (FLUSH) 0.9 %
10 SYRINGE (ML) INJECTION EVERY 12 HOURS SCHEDULED
Status: DISCONTINUED | OUTPATIENT
Start: 2023-09-21 | End: 2023-09-21 | Stop reason: HOSPADM

## 2023-09-21 RX ORDER — OXYTOCIN/0.9 % SODIUM CHLORIDE 30/500 ML
999 PLASTIC BAG, INJECTION (ML) INTRAVENOUS ONCE
Status: COMPLETED | OUTPATIENT
Start: 2023-09-21 | End: 2023-09-21

## 2023-09-21 RX ORDER — HYDROXYZINE PAMOATE 50 MG/1
50 CAPSULE ORAL 3 TIMES DAILY PRN
Status: DISCONTINUED | OUTPATIENT
Start: 2023-09-21 | End: 2023-09-23 | Stop reason: HOSPADM

## 2023-09-21 RX ORDER — DOCUSATE SODIUM 100 MG/1
100 CAPSULE, LIQUID FILLED ORAL 2 TIMES DAILY
Status: DISCONTINUED | OUTPATIENT
Start: 2023-09-21 | End: 2023-09-23 | Stop reason: HOSPADM

## 2023-09-21 RX ORDER — SODIUM CHLORIDE 0.9 % (FLUSH) 0.9 %
1-10 SYRINGE (ML) INJECTION AS NEEDED
Status: DISCONTINUED | OUTPATIENT
Start: 2023-09-21 | End: 2023-09-23 | Stop reason: HOSPADM

## 2023-09-21 RX ORDER — MISOPROSTOL 200 UG/1
800 TABLET ORAL AS NEEDED
Status: DISCONTINUED | OUTPATIENT
Start: 2023-09-21 | End: 2023-09-21 | Stop reason: HOSPADM

## 2023-09-21 RX ORDER — CALCIUM CARBONATE 500 MG/1
2 TABLET, CHEWABLE ORAL 3 TIMES DAILY PRN
Status: DISCONTINUED | OUTPATIENT
Start: 2023-09-21 | End: 2023-09-23 | Stop reason: HOSPADM

## 2023-09-21 RX ORDER — HYDROCORTISONE 25 MG/G
1 CREAM TOPICAL AS NEEDED
Status: DISCONTINUED | OUTPATIENT
Start: 2023-09-21 | End: 2023-09-23 | Stop reason: HOSPADM

## 2023-09-21 RX ORDER — ONDANSETRON 2 MG/ML
4 INJECTION INTRAMUSCULAR; INTRAVENOUS EVERY 6 HOURS PRN
Status: DISCONTINUED | OUTPATIENT
Start: 2023-09-21 | End: 2023-09-23 | Stop reason: HOSPADM

## 2023-09-21 RX ORDER — PROMETHAZINE HYDROCHLORIDE 25 MG/1
25 TABLET ORAL EVERY 6 HOURS PRN
Status: DISCONTINUED | OUTPATIENT
Start: 2023-09-21 | End: 2023-09-21 | Stop reason: HOSPADM

## 2023-09-21 RX ORDER — OXYTOCIN/0.9 % SODIUM CHLORIDE 30/500 ML
250 PLASTIC BAG, INJECTION (ML) INTRAVENOUS CONTINUOUS
Status: ACTIVE | OUTPATIENT
Start: 2023-09-21 | End: 2023-09-21

## 2023-09-21 RX ORDER — LIDOCAINE HYDROCHLORIDE AND EPINEPHRINE 15; 5 MG/ML; UG/ML
INJECTION, SOLUTION EPIDURAL AS NEEDED
Status: DISCONTINUED | OUTPATIENT
Start: 2023-09-21 | End: 2023-09-21 | Stop reason: SURG

## 2023-09-21 RX ORDER — ACETAMINOPHEN 325 MG/1
650 TABLET ORAL EVERY 6 HOURS
Status: DISCONTINUED | OUTPATIENT
Start: 2023-09-21 | End: 2023-09-23

## 2023-09-21 RX ORDER — ONDANSETRON 2 MG/ML
4 INJECTION INTRAMUSCULAR; INTRAVENOUS EVERY 6 HOURS PRN
Status: DISCONTINUED | OUTPATIENT
Start: 2023-09-21 | End: 2023-09-21 | Stop reason: HOSPADM

## 2023-09-21 RX ORDER — METHYLERGONOVINE MALEATE 0.2 MG/ML
200 INJECTION INTRAVENOUS ONCE AS NEEDED
Status: DISCONTINUED | OUTPATIENT
Start: 2023-09-21 | End: 2023-09-21 | Stop reason: HOSPADM

## 2023-09-21 RX ORDER — FENTANYL CITRATE 50 UG/ML
INJECTION, SOLUTION INTRAMUSCULAR; INTRAVENOUS AS NEEDED
Status: DISCONTINUED | OUTPATIENT
Start: 2023-09-21 | End: 2023-09-21 | Stop reason: SURG

## 2023-09-21 RX ORDER — LIDOCAINE HYDROCHLORIDE 10 MG/ML
0.5 INJECTION, SOLUTION INFILTRATION; PERINEURAL ONCE AS NEEDED
Status: DISCONTINUED | OUTPATIENT
Start: 2023-09-21 | End: 2023-09-21 | Stop reason: HOSPADM

## 2023-09-21 RX ORDER — BUTORPHANOL TARTRATE 1 MG/ML
1 INJECTION, SOLUTION INTRAMUSCULAR; INTRAVENOUS
Status: DISCONTINUED | OUTPATIENT
Start: 2023-09-21 | End: 2023-09-21

## 2023-09-21 RX ORDER — CARBOPROST TROMETHAMINE 250 UG/ML
250 INJECTION, SOLUTION INTRAMUSCULAR AS NEEDED
Status: DISCONTINUED | OUTPATIENT
Start: 2023-09-21 | End: 2023-09-21 | Stop reason: HOSPADM

## 2023-09-21 RX ORDER — IBUPROFEN 600 MG/1
600 TABLET ORAL EVERY 6 HOURS SCHEDULED
Status: DISCONTINUED | OUTPATIENT
Start: 2023-09-21 | End: 2023-09-23

## 2023-09-21 RX ORDER — SODIUM CHLORIDE 9 MG/ML
40 INJECTION, SOLUTION INTRAVENOUS AS NEEDED
Status: DISCONTINUED | OUTPATIENT
Start: 2023-09-21 | End: 2023-09-21 | Stop reason: HOSPADM

## 2023-09-21 RX ORDER — BISACODYL 10 MG
10 SUPPOSITORY, RECTAL RECTAL DAILY PRN
Status: DISCONTINUED | OUTPATIENT
Start: 2023-09-22 | End: 2023-09-23 | Stop reason: HOSPADM

## 2023-09-21 RX ORDER — CYCLOBENZAPRINE HCL 10 MG
10 TABLET ORAL 3 TIMES DAILY PRN
Status: DISCONTINUED | OUTPATIENT
Start: 2023-09-21 | End: 2023-09-23

## 2023-09-21 RX ORDER — SODIUM CHLORIDE, SODIUM LACTATE, POTASSIUM CHLORIDE, CALCIUM CHLORIDE 600; 310; 30; 20 MG/100ML; MG/100ML; MG/100ML; MG/100ML
125 INJECTION, SOLUTION INTRAVENOUS CONTINUOUS
Status: DISCONTINUED | OUTPATIENT
Start: 2023-09-21 | End: 2023-09-21

## 2023-09-21 RX ORDER — ONDANSETRON 4 MG/1
4 TABLET, FILM COATED ORAL EVERY 6 HOURS PRN
Status: DISCONTINUED | OUTPATIENT
Start: 2023-09-21 | End: 2023-09-23 | Stop reason: HOSPADM

## 2023-09-21 RX ORDER — HYDROXYZINE PAMOATE 25 MG/1
25 CAPSULE ORAL ONCE
Status: COMPLETED | OUTPATIENT
Start: 2023-09-21 | End: 2023-09-21

## 2023-09-21 RX ORDER — MISOPROSTOL 100 MCG
50 TABLET ORAL EVERY 4 HOURS
Status: DISCONTINUED | OUTPATIENT
Start: 2023-09-21 | End: 2023-09-21

## 2023-09-21 RX ORDER — FERROUS SULFATE TAB EC 324 MG (65 MG FE EQUIVALENT) 324 (65 FE) MG
324 TABLET DELAYED RESPONSE ORAL 2 TIMES DAILY WITH MEALS
Status: DISCONTINUED | OUTPATIENT
Start: 2023-09-21 | End: 2023-09-23 | Stop reason: HOSPADM

## 2023-09-21 RX ORDER — ACETAMINOPHEN 500 MG
1000 TABLET ORAL EVERY 6 HOURS
Status: DISCONTINUED | OUTPATIENT
Start: 2023-09-21 | End: 2023-09-21 | Stop reason: HOSPADM

## 2023-09-21 RX ORDER — PROMETHAZINE HYDROCHLORIDE 12.5 MG/1
12.5 SUPPOSITORY RECTAL EVERY 6 HOURS PRN
Status: DISCONTINUED | OUTPATIENT
Start: 2023-09-21 | End: 2023-09-21 | Stop reason: HOSPADM

## 2023-09-21 RX ORDER — BUPIVACAINE HYDROCHLORIDE 2.5 MG/ML
INJECTION, SOLUTION EPIDURAL; INFILTRATION; INTRACAUDAL AS NEEDED
Status: DISCONTINUED | OUTPATIENT
Start: 2023-09-21 | End: 2023-09-21 | Stop reason: SURG

## 2023-09-21 RX ORDER — SODIUM CHLORIDE 0.9 % (FLUSH) 0.9 %
10 SYRINGE (ML) INJECTION AS NEEDED
Status: DISCONTINUED | OUTPATIENT
Start: 2023-09-21 | End: 2023-09-21 | Stop reason: HOSPADM

## 2023-09-21 RX ORDER — OXYTOCIN/0.9 % SODIUM CHLORIDE 30/500 ML
125 PLASTIC BAG, INJECTION (ML) INTRAVENOUS CONTINUOUS PRN
Status: DISCONTINUED | OUTPATIENT
Start: 2023-09-21 | End: 2023-09-23 | Stop reason: HOSPADM

## 2023-09-21 RX ORDER — IBUPROFEN 800 MG/1
800 TABLET ORAL EVERY 8 HOURS
Status: DISCONTINUED | OUTPATIENT
Start: 2023-09-21 | End: 2023-09-21 | Stop reason: HOSPADM

## 2023-09-21 RX ORDER — PRENATAL VIT/IRON FUM/FOLIC AC 27MG-0.8MG
1 TABLET ORAL DAILY
Status: DISCONTINUED | OUTPATIENT
Start: 2023-09-21 | End: 2023-09-23 | Stop reason: HOSPADM

## 2023-09-21 RX ORDER — TERBUTALINE SULFATE 1 MG/ML
0.25 INJECTION, SOLUTION SUBCUTANEOUS ONCE
Status: COMPLETED | OUTPATIENT
Start: 2023-09-21 | End: 2023-09-21

## 2023-09-21 RX ADMIN — ACETAMINOPHEN 650 MG: 325 TABLET, FILM COATED ORAL at 18:26

## 2023-09-21 RX ADMIN — LIDOCAINE HYDROCHLORIDE AND EPINEPHRINE 3 ML: 15; 5 INJECTION, SOLUTION EPIDURAL at 11:18

## 2023-09-21 RX ADMIN — SODIUM CHLORIDE, POTASSIUM CHLORIDE, SODIUM LACTATE AND CALCIUM CHLORIDE 1000 ML: 600; 310; 30; 20 INJECTION, SOLUTION INTRAVENOUS at 10:56

## 2023-09-21 RX ADMIN — HYDROXYZINE PAMOATE 25 MG: 25 CAPSULE ORAL at 17:39

## 2023-09-21 RX ADMIN — IBUPROFEN 600 MG: 600 TABLET, FILM COATED ORAL at 19:44

## 2023-09-21 RX ADMIN — IBUPROFEN 800 MG: 800 TABLET, FILM COATED ORAL at 14:10

## 2023-09-21 RX ADMIN — TERBUTALINE SULFATE 0.25 MG: 1 INJECTION SUBCUTANEOUS at 06:37

## 2023-09-21 RX ADMIN — SODIUM CHLORIDE, POTASSIUM CHLORIDE, SODIUM LACTATE AND CALCIUM CHLORIDE 125 ML/HR: 600; 310; 30; 20 INJECTION, SOLUTION INTRAVENOUS at 11:26

## 2023-09-21 RX ADMIN — FENTANYL CITRATE 250 MCG: 50 INJECTION, SOLUTION INTRAMUSCULAR; INTRAVENOUS at 11:23

## 2023-09-21 RX ADMIN — CYCLOBENZAPRINE HYDROCHLORIDE 10 MG: 10 TABLET, FILM COATED ORAL at 19:44

## 2023-09-21 RX ADMIN — BUPIVACAINE HYDROCHLORIDE 10 ML: 2.5 INJECTION, SOLUTION EPIDURAL; INFILTRATION; INTRACAUDAL; PERINEURAL at 11:23

## 2023-09-21 RX ADMIN — Medication 50 MCG: at 06:15

## 2023-09-21 RX ADMIN — Medication 999 ML/HR: at 13:19

## 2023-09-21 RX ADMIN — SODIUM CHLORIDE, POTASSIUM CHLORIDE, SODIUM LACTATE AND CALCIUM CHLORIDE 125 ML/HR: 600; 310; 30; 20 INJECTION, SOLUTION INTRAVENOUS at 06:10

## 2023-09-21 RX ADMIN — Medication: at 15:29

## 2023-09-21 RX ADMIN — ACETAMINOPHEN 1000 MG: 500 TABLET, FILM COATED ORAL at 15:29

## 2023-09-21 NOTE — SIGNIFICANT NOTE
Patients mother came to nurses desk stating that the patient did not feel right.  Upon nurse arriving to room patient states she does not feel right , her right arm and hand were tingling, she felt dizzy,  and that her chest , back, neck and head were hurting. Patient explains the chest neck and back pain hurt with movement.  MD notified of complaints. Anesthesia also contacted about complaints. RN remained at patients bedside from 1700 till 1800.

## 2023-09-21 NOTE — INTERVAL H&P NOTE
H&P reviewed. The patient was examined and there are no changes to the H&P. SVE per RN 1-2 cm. FHT cat 1. Given 1 dose of SL Cytotec, had prolonged decel. Recovered with repositioning, IVF, terbutaline to cat 1. GBS negative. May have epidural when desired.    This document has been electronically signed by Chelsea Walsh MD on September 21, 2023 07:05 CDT.

## 2023-09-21 NOTE — ANESTHESIA PREPROCEDURE EVALUATION
Anesthesia Evaluation     Patient summary reviewed and Nursing notes reviewed   NPO Solid Status: > 8 hours  NPO Liquid Status: > 2 hours           Airway   Mallampati: II  TM distance: >3 FB  Neck ROM: full  no difficulty expected  Dental - normal exam     Pulmonary - normal exam   (+) asthma,  Cardiovascular - negative cardio ROS and normal exam        Neuro/Psych  (+) headaches, psychiatric history Anxiety  GI/Hepatic/Renal/Endo    (+) obesity, morbid obesity, diabetes mellitus gestational    Musculoskeletal     (+) back pain      ROS comment: Reports chronic back pain requiring medical consultation from previous epidural. Patient informed that another epidural could exacerbate symptoms. Verbalized understanding and wish to proceed.  Abdominal    Substance History      OB/GYN    (+) Pregnant        Other                        Anesthesia Plan    ASA 2     epidural       Anesthetic plan, risks, benefits, and alternatives have been provided, discussed and informed consent has been obtained with: patient.    Plan discussed with CRNA.

## 2023-09-21 NOTE — ANESTHESIA PROCEDURE NOTES
Labor Epidural      Patient location during procedure: OB  Performed By  CRNA/CAA: Christiano Noble CRNA  Preanesthetic Checklist  Completed: patient identified, IV checked, site marked, risks and benefits discussed, surgical consent, monitors and equipment checked, pre-op evaluation and timeout performed  Additional Notes  Pt has a herniated disc and has had previously difficult epidurals  Prep:  Pt Position:sitting  Sterile Tech:gloves, cap, gown, mask and sterile barrier  Prep:DuraPrep  Monitoring:blood pressure monitoring and continuous pulse oximetry  Epidural Block Procedure:  Approach:midline  Guidance:landmark technique and palpation technique  Location:L3-L4  Needle Type:Tuohy  Needle Gauge:17 G  Loss of Resistance Medium: saline  Loss of Resistance: 9cm  Cath Depth at skin:14 cm  Paresthesia: none  Aspiration:negative  Test Dose:negative  Number of Attempts: 1  Post Assessment:  Dressing:occlusive dressing applied and secured with tape  Pt Tolerance:patient tolerated the procedure well with no apparent complications  Complications:no

## 2023-09-21 NOTE — PROGRESS NOTES
S/p SL Cytotec x1 (615)  Had to receive terbutaline at 0637 due to prolonged FHT decel  SVE at 0735   No additional induction agents given  Patient received epidural  SVE  -> AROM clear fluid -> 6-7 cm  Anticipate   Will hold on pitocin at this time, will consider if contractions space out    Chelsea Walsh MD  2023  12:10 CDT

## 2023-09-21 NOTE — L&D DELIVERY NOTE
[facility]  Vaginal Delivery Note    Patient Name: Carlyn Aguila  : 1990  MRN: [id number]  Date of Delivery: 2023     Diagnosis     Pre & Post-Delivery:  Intrauterine pregnancy at 38w0d  Labor status: Induced Onset of Labor     Gestational diabetes mellitus (GDM) in third trimester controlled on oral hypoglycemic drug    Herpes simplex virus type 2 (HSV-2) infection affecting pregnancy in third trimester    History of gestational diabetes in prior pregnancy, currently pregnant in third trimester    Obesity affecting pregnancy in third trimester    Grand multiparity    Unwanted fertility    Rubella non-immune status, antepartum    Supervision of high risk pregnancy in third trimester    Single liveborn infant delivered vaginally             Problem List    Transfer to Postpartum     Review the Delivery Report for details.     Delivery     Delivery: Vaginal, Spontaneous     YOB: 2024    Time of Birth:  Gestational Age 1:15 PM   38w0d     Anesthesia: Epidural     Delivering clinician: Chelsea Walsh    Forceps?   No   Vacuum? No    Shoulder dystocia present: No        Delivery narrative:    Patient presented today for IOL secondary to GDMA2-metformin; she was 1 cm on admission.  She received 1 dose of Cytotec then about 20 minutes later, she had prolonged deceleration.  She received a dose of terbulatine with recovery to category 1 FHT.  1 hour after Cytotec dose, she was 4 cm.  She was monitored and allowed to be expectantly managed.  At 12PM, she had an amniotomy after having an epidural placed; at that time, she was 6-7 cm.  She progressed to complete cervical dilation at 1:09PM and began pushing at 1:15PM.  Patient pushed to deliver a viable 3760g female from the MILAGRO position over an intact perineum via  under epidural anesthesia on 2023 at 1:15PM.  No noted nuchal cord.  Right anterior shoulder was delivered with ease, followed  by left posterior shoulder.  Body was then delivered with gentle traction.  Mouth and nose bulb suctioned.  3 vessel cord clamped x2 and cut after 30 seconds of delayed clamping.  Baby was placed on mother's chest.  blood was obtained and sent.  Placenta delivered spontaneously intact and Pitocin was started.  Cervix, vagina, and perineum were examined and no laceration was noted.  EBL 100mL; QBL pending, please see nursing documentation.  Apgar scores 9/9.  Mother and infant stable.      Infant     Findings: female  infant     Infant observations: Weight: 3760 g (8 lb 4.6 oz)   Length: 21.25  in  Observations/Comments:        Apgars: 9  @ 1 minute /    9  @ 5 minutes   Infant Name: Baby Girl     Placenta & Cord         Placenta delivered  Spontaneous  at   2023  1:19 PM     Cord: 3 vessels  present.   Nuchal Cord?  no   Cord blood obtained: Yes    Cord gases obtained:  No    Cord gas results: Venous:  No results found for: PHCVEN    Arterial:  No results found for: PHCART     Repair     Episiotomy: None    No    Lacerations: No   Estimated Blood Loss: Est. Blood Loss (mL): 100 mL (Filed from Delivery Summary) (23 1315)     Quantitative Blood Loss: Quantitative Blood Loss (mL): 45 mL (from recovey) (23 1530)        Complications     GDMA2-metformin    Disposition     Mother to Mother Baby/Postpartum in stable condition currently.  Baby to remains with mom in stable condition currently.    Chelsea Walsh MD  23  13:28 CDT

## 2023-09-22 PROCEDURE — 0503F POSTPARTUM CARE VISIT: CPT | Performed by: OBSTETRICS & GYNECOLOGY

## 2023-09-22 RX ORDER — BACLOFEN 5 MG/1
5 TABLET ORAL 3 TIMES DAILY
Status: DISCONTINUED | OUTPATIENT
Start: 2023-09-22 | End: 2023-09-23

## 2023-09-22 RX ORDER — LIDOCAINE 50 MG/G
1 PATCH TOPICAL EVERY 24 HOURS
Status: DISCONTINUED | OUTPATIENT
Start: 2023-09-22 | End: 2023-09-23 | Stop reason: HOSPADM

## 2023-09-22 RX ORDER — ACETAMINOPHEN 325 MG/1
650 TABLET ORAL EVERY 6 HOURS PRN
Qty: 30 TABLET | Refills: 1 | Status: SHIPPED | OUTPATIENT
Start: 2023-09-22 | End: 2023-09-23 | Stop reason: HOSPADM

## 2023-09-22 RX ORDER — IBUPROFEN 600 MG/1
600 TABLET ORAL EVERY 6 HOURS PRN
Qty: 30 TABLET | Refills: 1 | Status: SHIPPED | OUTPATIENT
Start: 2023-09-22

## 2023-09-22 RX ORDER — BACLOFEN 5 MG/1
5 TABLET ORAL 3 TIMES DAILY
Qty: 20 TABLET | Refills: 0 | Status: SHIPPED | OUTPATIENT
Start: 2023-09-22

## 2023-09-22 RX ADMIN — PRENATAL VIT W/ FE FUMARATE-FA TAB 27-0.8 MG 1 TABLET: 27-0.8 TAB at 09:26

## 2023-09-22 RX ADMIN — HYDROXYZINE PAMOATE 50 MG: 50 CAPSULE ORAL at 13:16

## 2023-09-22 RX ADMIN — DOCUSATE SODIUM 100 MG: 100 CAPSULE, LIQUID FILLED ORAL at 09:26

## 2023-09-22 RX ADMIN — ACETAMINOPHEN 650 MG: 325 TABLET, FILM COATED ORAL at 00:14

## 2023-09-22 RX ADMIN — ACETAMINOPHEN 650 MG: 325 TABLET, FILM COATED ORAL at 07:55

## 2023-09-22 RX ADMIN — IBUPROFEN 600 MG: 600 TABLET, FILM COATED ORAL at 18:04

## 2023-09-22 RX ADMIN — ACETAMINOPHEN 650 MG: 325 TABLET, FILM COATED ORAL at 14:39

## 2023-09-22 RX ADMIN — ACETAMINOPHEN 650 MG: 325 TABLET, FILM COATED ORAL at 20:43

## 2023-09-22 RX ADMIN — DOCUSATE SODIUM 100 MG: 100 CAPSULE, LIQUID FILLED ORAL at 20:44

## 2023-09-22 RX ADMIN — IBUPROFEN 600 MG: 600 TABLET, FILM COATED ORAL at 04:45

## 2023-09-22 RX ADMIN — BACLOFEN 5 MG: 5 TABLET ORAL at 15:15

## 2023-09-22 RX ADMIN — FERROUS SULFATE TAB EC 324 MG (65 MG FE EQUIVALENT) 324 MG: 324 (65 FE) TABLET DELAYED RESPONSE at 18:04

## 2023-09-22 RX ADMIN — BACLOFEN 5 MG: 5 TABLET ORAL at 20:43

## 2023-09-22 RX ADMIN — FERROUS SULFATE TAB EC 324 MG (65 MG FE EQUIVALENT) 324 MG: 324 (65 FE) TABLET DELAYED RESPONSE at 07:55

## 2023-09-22 RX ADMIN — CYCLOBENZAPRINE HYDROCHLORIDE 10 MG: 10 TABLET, FILM COATED ORAL at 07:55

## 2023-09-22 RX ADMIN — LIDOCAINE 1 PATCH: 50 PATCH TOPICAL at 13:16

## 2023-09-22 RX ADMIN — IBUPROFEN 600 MG: 600 TABLET, FILM COATED ORAL at 12:40

## 2023-09-22 NOTE — LACTATION NOTE
Pt desires to formula feed.  Pt educated that she may notice some breast changes around day 3-5.  Milk suppression information provided for d/c.  No questions or concerns at this time.  Contact information available on handout in booklet if needing further assistance.

## 2023-09-22 NOTE — DISCHARGE INSTR - APPOINTMENTS
You have a 2 week telephone postpartum visit with Dr Walsh on Friday, October 6th @ 10:45.  You have a 6 week in office postpartum visit with Dr Walsh on Thursday, November 2nd @ 2:15.

## 2023-09-22 NOTE — DISCHARGE SUMMARY
[facility]  Discharge Summary  Patient Name: Carlyn Aguila  : 1990  MRN: [id number]  CSN: [id number]    Discharge Summary    Date of Admission: 2023   Date of Discharge: 2023    Principle Discharge Dx: Active Hospital Problems    Diagnosis  POA    **Gestational diabetes mellitus (GDM) in third trimester controlled on oral hypoglycemic drug [O24.415]  Yes     Based on BS log      Single liveborn infant delivered vaginally [Z38.00]  No    Supervision of high risk pregnancy in third trimester [O09.93]  Not Applicable    Grand multiparity [Z64.1]  Not Applicable    Unwanted fertility [Z30.09]  Yes     8/10- tubal papers signed.      Rubella non-immune status, antepartum [O09.899, Z28.39]  Not Applicable     Needs MMR PP      History of gestational diabetes in prior pregnancy, currently pregnant in third trimester [O09.293, Z86.32]  Not Applicable     Early glucola WNL      Obesity affecting pregnancy in third trimester [O99.213]  Yes    Herpes simplex virus type 2 (HSV-2) infection affecting pregnancy in third trimester [O98.513, B00.9]  Yes     Will need suppression at 36wks, does not want mentioned in front of anyone        Procedures Performed:    Brief History: Patient is a 33 y.o. now  who presented to labor and delivery at 38w0d for IOL secondary to GDMA-2.   Hospital Course: Patient presented at 38w0d for IOL.  She had a .  Her postpartum course was unremarkable.  On PPD #1 she expressed the desire for discharge.  She had passed gas and was urinating normally.  She was eating a regular diet without difficulty.  She was ambulating well.  Discharge instructions were given.  All questions were answered   Condition:  Discharge Activity: Stable  Activity Instructions       Bathing Restrictions      Type of Restriction: Bathing    Bathing Restrictions: Other    Explain Bathing Restrictions: No soaking in bathtub for 4 weeks. Showers are fine.     Driving Restrictions      Type of Restriction: Driving    Driving Restrictions: No Driving (Time Limited)    Length: Other    Indicate Length of Restriction: No driving for 1 week or if using narcotic pain medications. Riding is car is fine.    Lifting Restrictions      Type of Restriction: Lifting    Lifting Restrictions: Other    Explain Lifing Restrictions: No lifting more than infant and baby carrier together for 6 weeks.    Pelvic Rest      Nothing in the vagina for 6 weeks to include tampons, intercourse, or douching.    Sexual Activity Restrictions      Type of Restriction: Sex    Explain Sexual Activity Restrictions: No sexual intercourse for at least 6 weeks           Discharge Diet: Diet Instructions       Diet: Regular/House Diet      Discharge Diet: Regular/House Diet    Texture: Regular Texture (IDDSI 7)    Fluid Consistency: Thin (IDDSI 0)           Discharge Medications:    Your medication list        START taking these medications        Instructions Last Dose Given Next Dose Due   acetaminophen 325 MG tablet  Commonly known as: TYLENOL      Take 2 tablets by mouth Every 6 (Six) Hours As Needed for Mild Pain.       Baclofen 5 MG tablet  Commonly known as: LIORESAL      Take 1 tablet by mouth 3 (Three) Times a Day.       ibuprofen 600 MG tablet  Commonly known as: ADVIL,MOTRIN      Take 1 tablet by mouth Every 6 (Six) Hours As Needed for Mild Pain. **Take with food**              CONTINUE taking these medications        Instructions Last Dose Given Next Dose Due   cetirizine 10 MG tablet  Commonly known as: zyrTEC      Take 1 tablet by mouth Daily.       docusate sodium 100 MG capsule  Commonly known as: Colace      Take 1 capsule by mouth 2 (Two) Times a Day.       ferrous sulfate 325 (65 FE) MG tablet      Take 1 tablet by mouth 2 (Two) Times a Day.       ondansetron 8 MG tablet  Commonly known as: Zofran      Take 1 tablet by mouth Every 8 (Eight) Hours As Needed for Nausea or Vomiting.                  Where to Get Your Medications        These medications were sent to [facility]  [address] [place], [place] [address]      Hours: Monday to Friday 7 AM to 6 PM Phone: [phone]   acetaminophen 325 MG tablet  Baclofen 5 MG tablet  ibuprofen 600 MG tablet        Discharge Disposition: Home   Follow-up: No future appointments.  2 week PP visit (telephone)  6 week PP visit     <30 minutes was spent with the patient on the day of discharge.    This document has been electronically signed by Chelsea aWlsh MD on September 22, 2023 12:48 CDT.

## 2023-09-22 NOTE — PROGRESS NOTES
[facility]  Progress Note - Obstetrics    Name: Carlyn Aguila  MRN: [id number]  Location: [id number]  Date: 2023  CSN: [id number]       PPD #1 s/p  at 38w0d secondary to IOL in context of GDMA2 on metformin.     Patient seen and examined. Denies headache, dizziness, chest pain, shortness of breath, nausea, vomiting. After episode of chest pain/shortness of breath/headache/arm tingling last night, reports complete resolution of symptoms this morning after having heating pad, ice packs, and use of vistaril/muscle relaxer. Reports she was able to get some rest last night and is feeling overall well this morning. Denies any upper back/neck/shoulder pain as seen yesterday. Minimal pain in lower back around epidural site. Lochia equal to menses. OOB and ambulating. Tolerating regular diet. Denies bowel movement/flatus, reports urination without difficulty. Latched baby and  2-3 times yesterday, but is planning to primarily bottlefeed. Is having tubal ligation (pt reports unknown date), papers signed 8/10. Pt requesting to potentially go home today.    PHYSICAL EXAM  /80 (BP Location: Left arm, Patient Position: Sitting)   Pulse 87   Temp 98.1 °F (36.7 °C) (Oral)   Resp 18   LMP  (LMP Unknown)   SpO2 98%   Breastfeeding Unknown   General: AAOx3, no apparent distress.  Lungs: CTA B/L anteriorly, no wheezes, rales, rhonchi.  CV: Acceptable rate, regular rhythm, S1/S2 normal.  Abdomen: +BS, soft, nondistended, uterine fundus firm, nontender, and below umbilicus.  Lower extremities: No bilateral edema. 2+/4+ dorsalis pedis pulses B/L.    Intake/Output Summary (Last 24 hours) at 2023 0612  Last data filed at 2023 1554  Gross per 24 hour   Intake --   Output 945 ml   Net -945 ml       IMPRESSION  Carlyn Aguila is a 33 y.o.  PPD #1 s/p  at 38w0d secondary to IOL in context of GDMA2 on metformin. Doing well and recovering appropriately.  Meeting postpartum milestones at this time.    PLAN  1.  Postpartum s/p   - Continue routine postpartum care.  - Diet: Pregnancy/breastfeeding (minimally)  - DVT prophylaxis: OOB/ambulating  - Breastfeeding 2-3x/primarily planning to bottlefeed  - Contraception: Future tubal ligation; papers signed 8/10  - Discharge to home today/tomorrow, pending baby glucose control.  Follow-up in 2 weeks (telephone visit), 6 weeks.     This document has been electronically signed by Yashira Roque, Medical Student on 2023 06:12 CDT.    Attending Attestation  As the teaching physician, I have personally re-performed the physical exam and medical decision making activities included in the student note.    Patient seen and examined.  Doing well this morning.  Panic attack symptoms improved this morning.      /75 (BP Location: Right arm, Patient Position: Sitting)   Pulse 79   Temp 97.8 °F (36.6 °C) (Oral)   Resp 18   LMP  (LMP Unknown)   SpO2 98%   Breastfeeding No   Gen: NAD, AAO x3  Abd: Soft, NTND, uterus nontender and FFBU    A/P: Carlyn Aguila is a 33 y.o.  PPD #1 s/p  at 38w0d secondary to IOL for GDMA2; doing well and recovering appropriately.  - Contraception: BTL  - Bottlefeeding  - Discharge to home today    This document has been electronically signed by Chelsea Walsh MD on 2023 07:18 CDT.

## 2023-09-22 NOTE — PAYOR COMM NOTE
Eugenia Ochoa RN [facility]  [phone]     phone  [phone]      fax  Admit Inpatient     Carlyn Padgett (33 y.o. Female)       Date of Birth   1990    Social Security Number       Address   [address] [place] [address]    Home Phone   [phone]    MRN   [id number]       Hoahaoism   Nondenominational    Marital Status                               Admission Date   9/21/23    Admission Type   Elective    Admitting Provider   Chelsea Walsh MD    Attending Provider   Chelsea Walsh MD    Department, Room/Bed   [facility] MOTHER BABY, M754/1       Discharge Date       Discharge Disposition   Home or Self Care    Discharge Destination                                 Attending Provider: Chelsea Walsh MD    Allergies: Apple, Other    Isolation: None   Infection: None   Code Status: CPR    Ht: --   Wt: 107 kg (235 lb)    Admission Cmt: None   Principal Problem: Gestational diabetes mellitus (GDM) in third trimester controlled on oral hypoglycemic drug [O24.415]                   Active Insurance as of 9/21/2023       Primary Coverage       Payor Plan Insurance Group Employer/Plan Group    [employer] [place] [employer] [place]        Payor Plan Address Payor Plan Phone Number Payor Plan Fax Number Effective Dates    [address] [address] [phone]  4/23/2017 - None Entered    [facility] [id number]         Subscriber Name Subscriber Birth Date Member ID       CARLYN PADGETT 1990 [id number]                     Emergency Contacts        (Rel.) Home Phone Work Phone Mobile Phone    Kaylen Groves (Mother) [phone] -- --              Vital Signs (last day)       Date/Time Temp Temp src Pulse Resp BP Patient Position SpO2    09/22/23 0924 -- -- 98 18 117/64 Sitting 97    09/22/23 0644 97.8 (36.6) Oral 79 18 109/75 Sitting 98    09/21/23 2122 98.1 (36.7) Oral 87 18 118/80  Sitting 98    09/21/23 1830 -- -- 110 20 135/76 Lying 98    09/21/23 1755 -- -- 96 20 116/65 Lying 98    09/21/23 1740 -- -- 106 22 125/66 Sitting 98    09/21/23 1730 -- -- 105 22 117/70 Sitting 98    09/21/23 1720 -- -- 105 22 114/74 Sitting 98    09/21/23 1707 -- -- 107 24 133/72 Sitting 98    09/21/23 1704 -- -- 115 26 155/87 Sitting 99    09/21/23 1530 -- -- 103 -- 134/63 -- --    09/21/23 1515 -- -- 112 -- 136/67 -- --    09/21/23 1501 -- -- 99 -- 148/86 -- --    09/21/23 1500 -- -- 99 -- 148/86 -- --    09/21/23 1429 -- -- 99 -- 138/78 -- --    09/21/23 1416 -- -- 96 -- 133/74 -- --    09/21/23 1404 -- -- 96 -- 133/63 -- --    09/21/23 1345 -- -- 98 -- 142/82 -- --    09/21/23 1330 -- -- 101 -- 133/80 -- --    09/21/23 1133 -- -- 97 -- 132/75 -- --    09/21/23 1132 -- -- 101 -- -- -- 99    09/21/23 1131 -- -- 96 -- 135/84 -- --    09/21/23 1129 -- -- 109 -- 135/83 -- --    09/21/23 1127 -- -- 108 -- 132/85 -- --    09/21/23 1126 -- -- 113 -- -- -- 99    09/21/23 1123 -- -- 108 -- 134/89 -- --    09/21/23 1121 -- -- 104 -- -- -- 98    09/21/23 1117 -- -- 110 -- -- -- 79    09/21/23 1116 -- -- 108 -- -- -- 98    09/21/23 1111 -- -- 113 -- -- -- 99    09/21/23 1106 -- -- 106 -- -- -- 98    09/21/23 1016 98.5 (36.9) Oral 100 16 106/70 -- --    09/21/23 0720 -- -- 97 -- -- -- 99    09/21/23 0652 -- -- 108 -- -- -- 100    09/21/23 0647 -- -- 147 -- -- -- 99    09/21/23 0642 -- -- 117 -- -- -- 99    09/21/23 0632 -- -- 97 -- -- -- 99    09/21/23 0627 -- -- 91 -- -- -- 98    09/21/23 0622 -- -- 92 -- -- -- 97    09/21/23 0516 -- Oral 105 20 124/88 Sitting 98          Oxygen Therapy (last day)       Date/Time SpO2 Device (Oxygen Therapy) Flow (L/min) Oxygen Concentration (%) ETCO2 (mmHg)    09/22/23 0924 97 room air -- -- --    09/22/23 0644 98 room air -- -- --    09/21/23 2122 98 room air -- -- --    09/21/23 1830 98 room air -- -- --    09/21/23 1755 98 room air -- -- --    09/21/23 1740 98 room air -- -- --     09/21/23 1730 98 room air -- -- --    09/21/23 1720 98 room air -- -- --    09/21/23 1707 98 room air -- -- --    09/21/23 1704 99 room air -- -- --    09/21/23 1132 99 -- -- -- --    09/21/23 1126 99 -- -- -- --    09/21/23 1121 98 -- -- -- --    09/21/23 1117 79 -- -- -- --    09/21/23 1116 98 -- -- -- --    09/21/23 1111 99 -- -- -- --    09/21/23 1106 98 -- -- -- --    09/21/23 0720 99 -- -- -- --    09/21/23 0652 100 -- -- -- --    09/21/23 0647 99 -- -- -- --    09/21/23 0642 99 -- -- -- --    09/21/23 0632 99 -- -- -- --    09/21/23 0627 98 -- -- -- --    09/21/23 0622 97 -- -- -- --    09/21/23 0516 98 room air -- -- --          Current Facility-Administered Medications   Medication Dose Route Frequency Provider Last Rate Last Admin    acetaminophen (TYLENOL) tablet 650 mg  650 mg Oral Q6H Chelsea Walsh MD   650 mg at 09/22/23 0755    benzocaine-menthol (DERMOPLAST) 20-0.5 % topical spray   Topical PRN Chelsea Walsh MD   Given at 09/21/23 1529    bisacodyl (DULCOLAX) suppository 10 mg  10 mg Rectal Daily PRN Chelsea Walsh MD        calcium carbonate (TUMS) chewable tablet 500 mg (200 mg elemental)  2 tablet Oral TID PRN Chelsea Walsh MD        cyclobenzaprine (FLEXERIL) tablet 10 mg  10 mg Oral TID PRN Chelsea Walsh MD   10 mg at 09/22/23 0755    docusate sodium (COLACE) capsule 100 mg  100 mg Oral BID Chelsea Walsh MD   100 mg at 09/22/23 0926    ferrous sulfate EC tablet 324 mg  324 mg Oral BID With Meals Chelsea Walsh MD   324 mg at 09/22/23 0755    Hydrocortisone (Perianal) (ANUSOL-HC) 2.5 % rectal cream 1 application   1 application  Rectal PRN Chelsea Walsh MD        hydrOXYzine pamoate (VISTARIL) capsule 50 mg  50 mg Oral TID PRN Chelsea Walsh MD        ibuprofen (ADVIL,MOTRIN) tablet 600 mg  600 mg Oral Q6H Chelsea Walsh MD   600 mg at  23 0445    magnesium hydroxide (MILK OF MAGNESIA) suspension 10 mL  10 mL Oral Daily PRN Chelsea Walsh MD        ondansetron (ZOFRAN) tablet 4 mg  4 mg Oral Q6H PRN Chelsea Walsh MD        Or    ondansetron (ZOFRAN) injection 4 mg  4 mg Intravenous Q6H PRN Chelsea Walsh MD        oxytocin (PITOCIN) 30 units in 0.9% sodium chloride 500 mL (premix)  125 mL/hr Intravenous Continuous PRN Chelsea Walsh MD        prenatal vitamin tablet 1 tablet  1 tablet Oral Daily Chelsea Walsh MD   1 tablet at 23 0926    sodium chloride 0.9 % flush 1-10 mL  1-10 mL Intravenous PRN Chelsea Walsh MD            Operative/Procedure Notes (last 48 hours)        Chelsea Walsh MD at 23 1328          [facility]  Vaginal Delivery Note    Patient Name: Carlyn Aguila  : 1990  MRN: [id number]  Date of Delivery: 2023     Diagnosis     Pre & Post-Delivery:  Intrauterine pregnancy at 38w0d  Labor status: Induced Onset of Labor     Gestational diabetes mellitus (GDM) in third trimester controlled on oral hypoglycemic drug    Herpes simplex virus type 2 (HSV-2) infection affecting pregnancy in third trimester    History of gestational diabetes in prior pregnancy, currently pregnant in third trimester    Obesity affecting pregnancy in third trimester    Grand multiparity    Unwanted fertility    Rubella non-immune status, antepartum    Supervision of high risk pregnancy in third trimester    Single liveborn infant delivered vaginally             Problem List    Transfer to Postpartum     Review the Delivery Report for details.     Delivery     Delivery: Vaginal, Spontaneous     YOB: 2024    Time of Birth:  Gestational Age 1:15 PM   38w0d     Anesthesia: Epidural     Delivering clinician: Chelsea Walsh    Forceps?   No   Vacuum? No    Shoulder  dystocia present: No        Delivery narrative:    Patient presented today for IOL secondary to GDMA2-metformin; she was 1 cm on admission.  She received 1 dose of Cytotec then about 20 minutes later, she had prolonged deceleration.  She received a dose of terbulatine with recovery to category 1 FHT.  1 hour after Cytotec dose, she was 4 cm.  She was monitored and allowed to be expectantly managed.  At 12PM, she had an amniotomy after having an epidural placed; at that time, she was 6-7 cm.  She progressed to complete cervical dilation at 1:09PM and began pushing at 1:15PM.  Patient pushed to deliver a viable 3760g female from the MILAGRO position over an intact perineum via  under epidural anesthesia on 2023 at 1:15PM.  No noted nuchal cord.  Right anterior shoulder was delivered with ease, followed by left posterior shoulder.  Body was then delivered with gentle traction.  Mouth and nose bulb suctioned.  3 vessel cord clamped x2 and cut after 30 seconds of delayed clamping.  Baby was placed on mother's chest.  blood was obtained and sent.  Placenta delivered spontaneously intact and Pitocin was started.  Cervix, vagina, and perineum were examined and no laceration was noted.  EBL 100mL; QBL pending, please see nursing documentation.  Apgar scores 9/9.  Mother and infant stable.      Infant     Findings: female  infant     Infant observations: Weight: 3760 g (8 lb 4.6 oz)   Length: 21.25  in  Observations/Comments:        Apgars: 9  @ 1 minute /    9  @ 5 minutes   Infant Name: Baby Girl     Placenta & Cord         Placenta delivered  Spontaneous  at   2023  1:19 PM     Cord: 3 vessels  present.   Nuchal Cord?  no   Cord blood obtained: Yes    Cord gases obtained:  No    Cord gas results: Venous:  No results found for: PHCVEN    Arterial:  No results found for: PHCART     Repair     Episiotomy: None    No    Lacerations: No   Estimated Blood Loss: Est. Blood Loss (mL): 100 mL (Filed from Delivery Summary)  (23 1315)     Quantitative Blood Loss: Quantitative Blood Loss (mL): 45 mL (from recovey) (23 1530)        Complications     GDMA2-metformin    Disposition     Mother to Mother Baby/Postpartum in stable condition currently.  Baby to remains with mom in stable condition currently.    Chelsea Walsh MD  23  13:28 CDT    Electronically signed by Chelsea Walsh MD at 23 1723          Physician Progress Notes (last 48 hours)        Chelsea Walsh MD at 23 0612          [facility]  Progress Note - Obstetrics    Name: Carlyn Aguila  MRN: [id number]  Location: [facility]  Date: 2023  CSN: [id number]       PPD #1 s/p  at 38w0d secondary to IOL in context of GDMA2 on metformin.     Patient seen and examined. Denies headache, dizziness, chest pain, shortness of breath, nausea, vomiting. After episode of chest pain/shortness of breath/headache/arm tingling last night, reports complete resolution of symptoms this morning after having heating pad, ice packs, and use of vistaril/muscle relaxer. Reports she was able to get some rest last night and is feeling overall well this morning. Denies any upper back/neck/shoulder pain as seen yesterday. Minimal pain in lower back around epidural site. Lochia equal to menses. OOB and ambulating. Tolerating regular diet. Denies bowel movement/flatus, reports urination without difficulty. Latched baby and  2-3 times yesterday, but is planning to primarily bottlefeed. Is having tubal ligation (pt reports unknown date), papers signed 8/10. Pt requesting to potentially go home today.    PHYSICAL EXAM  /80 (BP Location: Left arm, Patient Position: Sitting)   Pulse 87   Temp 98.1 °F (36.7 °C) (Oral)   Resp 18   LMP  (LMP Unknown)   SpO2 98%   Breastfeeding Unknown   General: AAOx3, no apparent distress.  Lungs: CTA B/L anteriorly, no wheezes, rales, rhonchi.  CV: Acceptable rate,  regular rhythm, S1/S2 normal.  Abdomen: +BS, soft, nondistended, uterine fundus firm, nontender, and below umbilicus.  Lower extremities: No bilateral edema. 2+/4+ dorsalis pedis pulses B/L.    Intake/Output Summary (Last 24 hours) at 2023 0612  Last data filed at 2023 1554  Gross per 24 hour   Intake --   Output 945 ml   Net -945 ml       IMPRESSION  Carlyn Aguila is a 33 y.o.  PPD #1 s/p  at 38w0d secondary to IOL in context of GDMA2 on metformin. Doing well and recovering appropriately. Meeting postpartum milestones at this time.    PLAN  1.  Postpartum s/p   - Continue routine postpartum care.  - Diet: Pregnancy/breastfeeding (minimally)  - DVT prophylaxis: OOB/ambulating  - Breastfeeding 2-3x/primarily planning to bottlefeed  - Contraception: Future tubal ligation; papers signed 8/10  - Discharge to home today/tomorrow, pending baby glucose control.  Follow-up in 2 weeks (telephone visit), 6 weeks.     This document has been electronically signed by Yashira Roque, Medical Student on 2023 06:12 CDT.    Attending Attestation  As the teaching physician, I have personally re-performed the physical exam and medical decision making activities included in the student note.    Patient seen and examined.  Doing well this morning.  Panic attack symptoms improved this morning.      /75 (BP Location: Right arm, Patient Position: Sitting)   Pulse 79   Temp 97.8 °F (36.6 °C) (Oral)   Resp 18   LMP  (LMP Unknown)   SpO2 98%   Breastfeeding No   Gen: NAD, AAO x3  Abd: Soft, NTND, uterus nontender and FFBU    A/P: Carlyn Aguila is a 33 y.o.  PPD #1 s/p  at 38w0d secondary to IOL for GDMA2; doing well and recovering appropriately.  - Contraception: BTL  - Bottlefeeding  - Discharge to home today    This document has been electronically signed by Chelsea Walsh MD on 2023 07:18 CDT.    Electronically signed by Chelsea Walsh MD  at 23 0720       Chelsea Walsh MD at 23 1209          S/p SL Cytotec x1 (0615)  Had to receive terbutaline at 0637 due to prolonged FHT decel  SVE at 0735 2  No additional induction agents given  Patient received epidural  SVE  -> AROM clear fluid -> 6-7 cm  Anticipate   Will hold on pitocin at this time, will consider if contractions space out    Chelsea Walsh MD  2023  12:10 CDT     Electronically signed by Chelsea Walsh MD at 23 1210

## 2023-09-22 NOTE — PLAN OF CARE
Problem: Adult Inpatient Plan of Care  Goal: Plan of Care Review  Outcome: Ongoing, Progressing  Flowsheets (Taken 9/22/2023 0608)  Progress: improving  Plan of Care Reviewed With:   patient   mother  Outcome Evaluation: vaginal delivery 9/21 @1315, VSS, fundus firm, light bleeding, ambulates in room, patient's mother is support person, she has alot of anxiety, back pain(afraid epidural has injuried back), patient was given vistaril and flexeril yesterday, took shower to help relax,also did cold packs and heating pad. Patient did sleep most of the night and states has improved. She is hoping to go home today. bottle feeding. Has 6 boys at home, this is first girl.  Goal: Patient-Specific Goal (Individualized)  Outcome: Ongoing, Progressing  Goal: Absence of Hospital-Acquired Illness or Injury  Outcome: Ongoing, Progressing  Intervention: Identify and Manage Fall Risk  Recent Flowsheet Documentation  Taken 9/22/2023 0445 by Merly Brock, RN  Safety Promotion/Fall Prevention: safety round/check completed  Taken 9/22/2023 0215 by Merly Brock, RN  Safety Promotion/Fall Prevention: (sleeping) safety round/check completed  Taken 9/22/2023 0015 by Merly Brock, RN  Safety Promotion/Fall Prevention: (meds, up to bathroom) safety round/check completed  Taken 9/21/2023 2316 by Merly Brock, RN  Safety Promotion/Fall Prevention: (sleeping) safety round/check completed  Taken 9/21/2023 2200 by Merly Brock, RN  Safety Promotion/Fall Prevention: (got pt snack/drink) safety round/check completed  Taken 9/21/2023 2122 by Merly Brock, RN  Safety Promotion/Fall Prevention: safety round/check completed  Taken 9/21/2023 2000 by Merly Brock, RN  Safety Promotion/Fall Prevention: safety round/check completed  Taken 9/21/2023 1900 by Merly Brock, RN  Safety Promotion/Fall Prevention: safety round/check completed  Intervention: Prevent and Manage VTE (Venous Thromboembolism) Risk  Recent Flowsheet  Documentation  Taken 9/21/2023 2122 by Merly Brock, RN  Activity Management: (discussed safety due to taking flexeril)   ambulated in room   ambulated to bathroom  VTE Prevention/Management: patient refused intervention  Goal: Optimal Comfort and Wellbeing  Outcome: Ongoing, Progressing  Intervention: Provide Person-Centered Care  Recent Flowsheet Documentation  Taken 9/21/2023 2122 by Merly Brock, RN  Trust Relationship/Rapport:   care explained   choices provided   emotional support provided   empathic listening provided   questions answered   questions encouraged   reassurance provided   thoughts/feelings acknowledged  Goal: Readiness for Transition of Care  Outcome: Ongoing, Progressing   Goal Outcome Evaluation:  Plan of Care Reviewed With: patient, mother        Progress: improving  Outcome Evaluation: vaginal delivery 9/21 @1315, VSS, fundus firm, light bleeding, ambulates in room, patient's mother is support person, she has alot of anxiety, back pain(afraid epidural has injuried back), patient was given vistaril and flexeril yesterday, took shower to help relax,also did cold packs and heating pad. Patient did sleep most of the night and states has improved. She is hoping to go home today. bottle feeding. Has 6 boys at home, this is first girl.

## 2023-09-23 ENCOUNTER — APPOINTMENT (OUTPATIENT)
Dept: MRI IMAGING | Facility: HOSPITAL | Age: 33
End: 2023-09-23
Payer: COMMERCIAL

## 2023-09-23 ENCOUNTER — APPOINTMENT (OUTPATIENT)
Dept: CT IMAGING | Facility: HOSPITAL | Age: 33
End: 2023-09-23
Payer: COMMERCIAL

## 2023-09-23 VITALS
RESPIRATION RATE: 18 BRPM | SYSTOLIC BLOOD PRESSURE: 117 MMHG | OXYGEN SATURATION: 98 % | DIASTOLIC BLOOD PRESSURE: 61 MMHG | TEMPERATURE: 98.9 F | HEART RATE: 95 BPM

## 2023-09-23 DIAGNOSIS — M54.12 CERVICAL RADICULOPATHY: ICD-10-CM

## 2023-09-23 PROCEDURE — 72128 CT CHEST SPINE W/O DYE: CPT

## 2023-09-23 PROCEDURE — 72125 CT NECK SPINE W/O DYE: CPT

## 2023-09-23 PROCEDURE — 0503F POSTPARTUM CARE VISIT: CPT | Performed by: STUDENT IN AN ORGANIZED HEALTH CARE EDUCATION/TRAINING PROGRAM

## 2023-09-23 PROCEDURE — 97161 PT EVAL LOW COMPLEX 20 MIN: CPT | Performed by: PHYSICAL THERAPIST

## 2023-09-23 PROCEDURE — 72131 CT LUMBAR SPINE W/O DYE: CPT

## 2023-09-23 RX ORDER — OXYCODONE AND ACETAMINOPHEN 10; 325 MG/1; MG/1
1 TABLET ORAL EVERY 4 HOURS PRN
Status: DISCONTINUED | OUTPATIENT
Start: 2023-09-23 | End: 2023-09-23 | Stop reason: HOSPADM

## 2023-09-23 RX ORDER — OXYCODONE HYDROCHLORIDE AND ACETAMINOPHEN 5; 325 MG/1; MG/1
1 TABLET ORAL EVERY 4 HOURS PRN
Status: DISCONTINUED | OUTPATIENT
Start: 2023-09-23 | End: 2023-09-23 | Stop reason: HOSPADM

## 2023-09-23 RX ORDER — GABAPENTIN 300 MG/1
300 CAPSULE ORAL EVERY 8 HOURS SCHEDULED
Status: DISCONTINUED | OUTPATIENT
Start: 2023-09-23 | End: 2023-09-23 | Stop reason: HOSPADM

## 2023-09-23 RX ORDER — OXYCODONE HYDROCHLORIDE AND ACETAMINOPHEN 5; 325 MG/1; MG/1
1 TABLET ORAL EVERY 4 HOURS PRN
Qty: 6 TABLET | Refills: 0 | Status: SHIPPED | OUTPATIENT
Start: 2023-09-23 | End: 2023-09-23 | Stop reason: SDUPTHER

## 2023-09-23 RX ORDER — CYCLOBENZAPRINE HCL 10 MG
10 TABLET ORAL EVERY 8 HOURS SCHEDULED
Status: DISCONTINUED | OUTPATIENT
Start: 2023-09-23 | End: 2023-09-23 | Stop reason: HOSPADM

## 2023-09-23 RX ORDER — OXYCODONE HYDROCHLORIDE AND ACETAMINOPHEN 5; 325 MG/1; MG/1
1 TABLET ORAL EVERY 4 HOURS PRN
Qty: 6 TABLET | Refills: 0 | Status: SHIPPED | OUTPATIENT
Start: 2023-09-23

## 2023-09-23 RX ORDER — IBUPROFEN 800 MG/1
800 TABLET ORAL EVERY 8 HOURS SCHEDULED
Status: DISCONTINUED | OUTPATIENT
Start: 2023-09-23 | End: 2023-09-23 | Stop reason: HOSPADM

## 2023-09-23 RX ADMIN — OXYCODONE HYDROCHLORIDE AND ACETAMINOPHEN 1 TABLET: 5; 325 TABLET ORAL at 08:53

## 2023-09-23 RX ADMIN — DOCUSATE SODIUM 100 MG: 100 CAPSULE, LIQUID FILLED ORAL at 08:53

## 2023-09-23 RX ADMIN — GABAPENTIN 300 MG: 300 CAPSULE ORAL at 05:12

## 2023-09-23 RX ADMIN — LIDOCAINE 1 PATCH: 50 PATCH TOPICAL at 13:20

## 2023-09-23 RX ADMIN — OXYCODONE HYDROCHLORIDE AND ACETAMINOPHEN 1 TABLET: 5; 325 TABLET ORAL at 13:13

## 2023-09-23 RX ADMIN — IBUPROFEN 800 MG: 800 TABLET, FILM COATED ORAL at 05:11

## 2023-09-23 RX ADMIN — PRENATAL VIT W/ FE FUMARATE-FA TAB 27-0.8 MG 1 TABLET: 27-0.8 TAB at 08:53

## 2023-09-23 RX ADMIN — FERROUS SULFATE TAB EC 324 MG (65 MG FE EQUIVALENT) 324 MG: 324 (65 FE) TABLET DELAYED RESPONSE at 08:53

## 2023-09-23 RX ADMIN — CYCLOBENZAPRINE HYDROCHLORIDE 10 MG: 10 TABLET, FILM COATED ORAL at 05:38

## 2023-09-23 NOTE — DISCHARGE SUMMARY
[facility]  Delivery Discharge Summary    Primary OB Clinician:     EDC: Estimated Date of Delivery: 10/5/23    Gestational Age:38w0d    Antepartum complications: gestational diabetes    Date of Delivery: 2023   Time of Delivery: 1:15 PM     Delivered By:  Chelsea Walsh     Delivery Type: Vaginal, Spontaneous      Tubal Ligation: Will plan outpatient    Baby:female  infant;   Apgar:  9  @ 1 minute /   Apgar:  9  @ 5 minutes   Weight: 3760 g (8 lb 4.6 oz)    Length: 21.25     Anesthesia: Epidural      Intrapartum complications: None    Laceration: No    Episiotomy: No    Placenta: Spontaneous     Feeding method: Breastfeeding Status: No    Rh Immune globulin given: not applicable    Rubella vaccine given: Declines    Discharge Date: 2023; Discharge Time: 11:53 CDT    Early Discharge:  NO    Plan:    Assessment:  PPD#2 - 34yo F, , s/p a  at 38 weeks gestation.  Pregnancy c/b GDMA2 - Metformin.  Postpartum c/b suspected Cervical radiculopathy.  Rh+/RubNI/GBSneg.  Meeting postpartum milestones and stable for discharge.      Plan:  - Pain: Ibuprofen 600mg Q6hrs prn + Percocet 5/325mg Q4hrs prn  - Cervical radiculopathy: Baclofen 5mg Q8hrs prn; Plan outpatient PT - referral placed (imaging will be forwarded)  - Contraception: Desires permanent sterilization (will plan outpatient)  - Diet: Regular  - Activity: Shower as often as you like, but avoid tub baths or swimming until after your postpartum checkup. There should be nothing placed in the vagina until after your postpartum checkup. This means no tampons, douching or intercourse (sex).  - Disposition: Discharge to home with Follow-up in 6 weeks for routine postpartum care and contraceptive management        Address and phone number verified and same.  Follow-up appointment in 6 weeks.

## 2023-09-23 NOTE — SIGNIFICANT NOTE
Requested to see patient for evaluation of headache/neck and back pain.  Delivered 9-21-23 after reportedly uneventful epidural placement.  Pt describes pain as sharp, with radiation to the groin, left shoulder and upper neck.  Denies photophobia, nausea.Better sitting upright and with heating pad to neck/upper back.  Worse laying flat.  States flexeril, narcotics have not helped the pain.Getting up to the bathroom has no effect on the pain.    The symptoms described by the patient do not fit with the diagnosis of a PDPH. Further investigation warranted.

## 2023-09-23 NOTE — DISCHARGE INSTR - ACTIVITY
"Notify Dr of... heavy bleeding, passing clots, foul odor to your discharge, temperature above 100.4, burning when urinating, gapping or drainage from incision or episiotomy, or for pain not relieved by taking pain medication, redness or streaking in breasts, pain or redness in legs.    Take all medications as prescribed.  Continue taking iron or prenatal vitamin while breastfeeding or until you run out.  Take rest periods several times during the day.  \"Baby Blues\" are normal and may be present around the 3rd-4th day after delivery. If they last longer than 2-3 days, please let your Dr know.  Pelvic rest for 6 weeks. No douching, tampons, or intercourse  No driving for 2 weeks  No lifting anything heavier than the baby  Wear a good supportive bra 24 hours/day to prevent engorgement\  "
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
## 2023-09-23 NOTE — THERAPY DISCHARGE NOTE
Patient Name: Carlyn Aguila  : 1990    MRN: [id number]                              Today's Date: 2023       Admit Date: 2023    Visit Dx:     ICD-10-CM ICD-9-CM   1. Gestational diabetes mellitus (GDM) in third trimester controlled on oral hypoglycemic drug  O24.415 648.80     Patient Active Problem List   Diagnosis    Anxiety about health    Herpes simplex virus type 2 (HSV-2) infection affecting pregnancy in third trimester    History of gestational diabetes in prior pregnancy, currently pregnant in third trimester    Obesity affecting pregnancy in third trimester    Grand multiparity    Unwanted fertility    Rubella non-immune status, antepartum    Supervision of high risk pregnancy in third trimester    Gestational diabetes mellitus (GDM) in third trimester controlled on oral hypoglycemic drug    Single liveborn infant delivered vaginally     Past Medical History:   Diagnosis Date    Anxiety     Asthma     Gestational diabetes     Herpes     Migraine      Past Surgical History:   Procedure Laterality Date    WISDOM TOOTH EXTRACTION        General Information       Row Name 23          Physical Therapy Time and Intention    Document Type evaluation  -BS     Mode of Treatment individual therapy;physical therapy  -BS       Row Name 23          General Information    Patient Profile Reviewed yes  -BS     Prior Level of Function independent:;all household mobility;community mobility;transfer;ADL's;cleaning;cooking;home management;driving;shopping  -BS     Existing Precautions/Restrictions other (see comments)  just gave birth to her daugther 2 days ago. In post partum unit  -BS     Barriers to Rehab none identified  -BS       Row Name 23          Living Environment    People in Home spouse;other (see comments);child(watson), dependent  7 children  -BS     Name(s) of People in Home Eladio Aguila,   -BS       Row Name 23          Home Main Entrance     Number of Stairs, Main Entrance three  -BS     Stair Railings, Main Entrance none  -BS       Row Name 09/23/23 0928          Stairs Within Home, Primary    Number of Stairs, Within Home, Primary none  -BS       Row Name 09/23/23 0928          Cognition    Orientation Status (Cognition) oriented x 4  -BS       Row Name 09/23/23 0928          Safety Issues, Functional Mobility    Impairments Affecting Function (Mobility) pain;range of motion (ROM)  -BS               User Key  (r) = Recorded By, (t) = Taken By, (c) = Cosigned By      Initials Name Provider Type    Eliecer Correa, PT Physical Therapist                   Mobility       Row Name 09/23/23 0928          Bed Mobility    Bed Mobility supine-sit;sit-supine  -BS     Supine-Sit [place] (Bed Mobility) minimum assist (75% patient effort)  -BS     Sit-Supine [place] (Bed Mobility) modified independence  -BS     Assistive Device (Bed Mobility) bed rails;head of bed elevated  -BS       Row Name 09/23/23 0928          Bed-Chair Transfer    Bed-Chair [place] (Transfers) not tested  -BS       Row Name 09/23/23 0928          Sit-Stand Transfer    Sit-Stand [place] (Transfers) not tested  -BS       Row Name 09/23/23 0928          Gait/Stairs (Locomotion)    [place] Level (Gait) not tested  -BS     [place] Level (Stairs) not tested  -BS               User Key  (r) = Recorded By, (t) = Taken By, (c) = Cosigned By      Initials Name Provider Type    Eliecer Correa, PT Physical Therapist                   Obj/Interventions       Row Name 09/23/23 0928          Range of Motion Comprehensive    General Range of Motion neck/trunk range of motion deficits identified  -BS     Comment, General Range of Motion cervical spine AROM: severe 25% ROM loss with flex/extension/lateral flex B/rotation B-all planes of motion  -BS       Row Name 09/23/23 0928          Strength Comprehensive (MMT)    General Manual Muscle Testing (MMT) Assessment upper  extremity strength deficits identified  -BS     Comment, General Manual Muscle Testing (MMT) Assessment 5/5 B myotomes C5-T1 except 4/5 B C5 (shoulder flexion)  -BS       Row Name 09/23/23 0928          Balance    Balance Assessment sitting static balance;sitting dynamic balance  -BS     Static Sitting Balance independent  -BS     Dynamic Sitting Balance independent  -BS     Position, Sitting Balance unsupported  -BS       Row Name 09/23/23 0928          Sensory Assessment (Somatosensory)    Sensory Assessment (Somatosensory) left UE;right UE  -BS     Left UE Sensory Assessment light touch awareness;intact  -BS     Right UE Sensory Assessment light touch awareness;intact  -BS       Row Name 09/23/23 0928          General Neck/Trunk Range of Motion    Comment, ROM: Neck/Trunk cervical spine AROM: severe limit with all planes of motion; improved cervical rotation B AROM post assessment  -BS       Row Name 09/23/23 0928          Upper Extremity (Manual Muscle Testing)    Comment, MMT: Upper Extremity UE neurodynamic testing: negative median nerve bias B UE's with ULTT 2  -BS               User Key  (r) = Recorded By, (t) = Taken By, (c) = Cosigned By      Initials Name Provider Type    BS Eliecer Scales, PT Physical Therapist                   Goals/Plan    No documentation.                  Clinical Impression       Row Name 09/23/23 0941 09/23/23 0928       Pain    Pretreatment Pain Rating 10/10  -BS 10/10  -BS    Posttreatment Pain Rating -- 1/10  -BS    Pain Location - Side/Orientation other (see comments)  central  -BS --    Pain Location - neck  -BS --    Pre/Posttreatment Pain Comment -- supported with HOB elevated; took heating pad off her neck region, informed nursing to add an ice pack to her neck to treat her acute inflammation/pain.  -BS    Pain Intervention(s) -- Repositioned;Heat applied;Cold applied  -BS      Row Name 09/23/23 0928          Plan of Care Review    Plan of Care Reviewed With patient;mother   -BS     Outcome Evaluation PT evaluation completed. Patient alert and oriented x 4. Presents with 10/10 neck pain and intermittent frontal and occipital headaches. Ministerio with supine to sit tasks, modified independent sit to supine. Deferred OOB transfer assessment due to severe neck pain noted sitting unsupported at the EOB. Severe AROM loss with all planes of motion with the cervical spine. Upper limb tension testing bilaterally negative for adverse neural tissue tension along B median nerves (B UE's). 5/5 B myotomal strength C5-T1 except 4/5 bilateral C 5 (shoulder flexion) due to neck pain with resistance. Educated patient and pt's mother on appropriateness for ice with acute inflammation with suspected cervical paraspinal strain (upper trapezius bilaterally). Had more ROM into cervical rotation bilaterally post tx with HEP issued of repeated cervical flex AROM + self overpressure in the mendoza's position. PT evaluation only. Recommend patient d/c home with family to assist as needed. Recommend outpatient PT to follow up for a suspected acute cervical paraspinal strain. Patient had 1/10 neck pain post assessment after returned to the mendoza's position with her neck supported.  -BS       Row Name 09/23/23 0928          Therapy Assessment/Plan (PT)    Patient/Family Therapy Goals Statement (PT) go home with her baby, hold her baby  -BS     Criteria for Skilled Interventions Met (PT) yes;meets criteria;other (see comments)  PT eval only  -BS     Therapy Frequency (PT) evaluation only  -BS       Row Name 09/23/23 0941 09/23/23 0928       Vital Signs    Pre Systolic BP Rehab 139  -BS --    Pre Treatment Diastolic BP 89  -BS --    Intra Systolic BP Rehab -- 131  -BS    Intra Treatment Diastolic BP -- 82  -BS    Pretreatment Heart Rate (beats/min) 106  -BS --    Intratreatment Heart Rate (beats/min) -- 98  -BS    Pre SpO2 (%) 97  -BS --    O2 Delivery Pre Treatment room air  -BS --    Intra SpO2 (%) -- 100  -BS    O2  Delivery Intra Treatment -- room air  -BS    Pre Patient Position Supine  -BS Supine  -BS    Intra Patient Position -- Sitting  -BS    Post Patient Position -- Supine  -BS      Row Name 09/23/23 0941 09/23/23 0928       Positioning and Restraints    Pre-Treatment Position in bed  -BS in bed  -BS    Post Treatment Position -- bed  -BS    In Bed -- notified nsg;fowlers;with family/caregiver  -BS              User Key  (r) = Recorded By, (t) = Taken By, (c) = Cosigned By      Initials Name Provider Type    Eliecer Correa, PT Physical Therapist                   Outcome Measures       Row Name 09/23/23 0928          How much help from another person do you currently need...    Turning from your back to your side while in flat bed without using bedrails? 4  -BS     Moving from lying on back to sitting on the side of a flat bed without bedrails? 4  -BS     Moving to and from a bed to a chair (including a wheelchair)? 3  -BS     Standing up from a chair using your arms (e.g., wheelchair, bedside chair)? 3  -BS     Climbing 3-5 steps with a railing? 3  -BS     To walk in hospital room? 3  -BS     AM-PAC 6 Clicks Score (PT) 20  -BS     Highest level of mobility 6 --> Walked 10 steps or more  -BS       Row Name 09/23/23 0928          Functional Assessment    Outcome Measure Options AM-PAC 6 Clicks Basic Mobility (PT)  -BS               User Key  (r) = Recorded By, (t) = Taken By, (c) = Cosigned By      Initials Name Provider Type    Eliecer Correa, PT Physical Therapist                    PT Recommendation and Plan     Plan of Care Reviewed With: patient, mother  Outcome Evaluation: PT evaluation completed. Patient alert and oriented x 4. Presents with 10/10 neck pain and intermittent frontal and occipital headaches. Ministerio with supine to sit tasks, modified independent sit to supine. Deferred OOB transfer assessment due to severe neck pain noted sitting unsupported at the EOB. Severe AROM loss with all planes of motion  with the cervical spine. Upper limb tension testing bilaterally negative for adverse neural tissue tension along B median nerves (B UE's). 5/5 B myotomal strength C5-T1 except 4/5 bilateral C 5 (shoulder flexion) due to neck pain with resistance. Educated patient and pt's mother on appropriateness for ice with acute inflammation with suspected cervical paraspinal strain (upper trapezius bilaterally). Had more ROM into cervical rotation bilaterally post tx with HEP issued of repeated cervical flex AROM + self overpressure in the mendoza's position. PT evaluation only. Recommend patient d/c home with family to assist as needed. Recommend outpatient PT to follow up for a suspected acute cervical paraspinal strain. Patient had 1/10 neck pain post assessment after returned to the mendoza's position with her neck supported.     Time Calculation:   PT Evaluation Complexity  History, PT Evaluation Complexity: 1-2 personal factors and/or comorbidities  Examination of Body Systems (PT Eval Complexity): 1-2 elements  Clinical Presentation (PT Evaluation Complexity): stable  Clinical Decision Making (PT Evaluation Complexity): low complexity  Overall Complexity (PT Evaluation Complexity): low complexity     PT Charges       Row Name 09/23/23 1058             Time Calculation    Start Time 0928  -BS      Stop Time 1021  -BS      Time Calculation (min) 53 min  -BS      PT Received On 09/23/23  -BS      PT Goal Re-Cert Due Date 09/23/23  -BS         Time Calculation- PT    Total Timed Code Minutes- PT 53 minute(s)  -BS                User Key  (r) = Recorded By, (t) = Taken By, (c) = Cosigned By      Initials Name Provider Type    BS Eliecer Scales, PT Physical Therapist                  Therapy Charges for Today       Code Description Service Date Service Provider Modifiers Qty    [id number]  PT EVAL LOW COMPLEXITY 4 9/23/2023 Eliecer Scales, PT GP 1            PT G-Codes  Outcome Measure Options: AM-PAC 6 Clicks Basic Mobility  (PT)  AM-PAC 6 Clicks Score (PT): 20    PT Discharge Summary  Anticipated Discharge Disposition (PT): home, home with assist    Eliecer Scales, PT  9/23/2023

## 2023-09-23 NOTE — NURSING NOTE
"Pt mother called out stated something is not right with her daughter and requested MD to bedside. Mother also requested MRI or \"something to be done\". Pt states she has been having neck/ back pain since delivery. New findings of dizziness and headache. Heat/ ice, PRN medications and schedule medications are unsuccessful in pain control. Pt does state that when she lays semi-fowlers that the headache/dizzy is gone but the back/neck pain remains.   MD Jackson called. MD ordered MRI and CRNA consultation.     "

## 2023-09-23 NOTE — PLAN OF CARE
Goal Outcome Evaluation:  Plan of Care Reviewed With: patient, mother           Outcome Evaluation: PT evaluation completed. Patient alert and oriented x 4. Presents with 10/10 neck pain and intermittent frontal and occipital headaches. Ministerio with supine to sit tasks, modified independent sit to supine. Deferred OOB transfer assessment due to severe neck pain noted sitting unsupported at the EOB. Severe AROM loss with all planes of motion with the cervical spine. Upper limb tension testing bilaterally negative for adverse neural tissue tension along B median nerves (B UE's). 5/5 B myotomal strength C5-T1 except 4/5 bilateral C 5 (shoulder flexion) due to neck pain with resistance. Educated patient and pt's mother on appropriateness for ice with acute inflammation with suspected cervical paraspinal strain (upper trapezius bilaterally). Had more ROM into cervical rotation bilaterally post tx with HEP issued of repeated cervical flex AROM + self overpressure in the mendoza's position. PT evaluation only. Recommend patient d/c home with family to assist as needed. Recommend outpatient PT to follow up for a suspected acute cervical paraspinal strain. Patient had 1/10 neck pain post assessment after returned to the mendoza's position with her neck supported.      Anticipated Discharge Disposition (PT): home, home with assist

## 2023-09-25 ENCOUNTER — APPOINTMENT (OUTPATIENT)
Dept: CT IMAGING | Facility: HOSPITAL | Age: 33
End: 2023-09-25
Payer: COMMERCIAL

## 2023-09-25 ENCOUNTER — HOSPITAL ENCOUNTER (OUTPATIENT)
Facility: HOSPITAL | Age: 33
Discharge: HOME | End: 2023-09-25
Attending: EMERGENCY MEDICINE | Admitting: STUDENT IN AN ORGANIZED HEALTH CARE EDUCATION/TRAINING PROGRAM
Payer: COMMERCIAL

## 2023-09-25 VITALS
DIASTOLIC BLOOD PRESSURE: 85 MMHG | HEART RATE: 82 BPM | TEMPERATURE: 97.5 F | RESPIRATION RATE: 17 BRPM | SYSTOLIC BLOOD PRESSURE: 125 MMHG | HEIGHT: 64 IN | WEIGHT: 220 LBS | OXYGEN SATURATION: 100 % | BODY MASS INDEX: 37.56 KG/M2

## 2023-09-25 DIAGNOSIS — G97.1 SPINAL HEADACHE: ICD-10-CM

## 2023-09-25 LAB
ALBUMIN SERPL-MCNC: 3.3 G/DL (ref 3.5–5.2)
ALBUMIN/GLOB SERPL: 1 G/DL
ALP SERPL-CCNC: 247 U/L (ref 39–117)
ALT SERPL W P-5'-P-CCNC: 13 U/L (ref 1–33)
ANION GAP SERPL CALCULATED.3IONS-SCNC: 10 MMOL/L (ref 5–15)
AST SERPL-CCNC: 16 U/L (ref 1–32)
BASOPHILS # BLD AUTO: 0.02 10*3/MM3 (ref 0–0.2)
BASOPHILS NFR BLD AUTO: 0.2 % (ref 0–1.5)
BILIRUB SERPL-MCNC: 0.4 MG/DL (ref 0–1.2)
BUN SERPL-MCNC: 7 MG/DL (ref 6–20)
BUN/CREAT SERPL: 15.2 (ref 7–25)
CALCIUM SPEC-SCNC: 9.3 MG/DL (ref 8.6–10.5)
CHLORIDE SERPL-SCNC: 105 MMOL/L (ref 98–107)
CO2 SERPL-SCNC: 26 MMOL/L (ref 22–29)
CREAT SERPL-MCNC: 0.46 MG/DL (ref 0.57–1)
DEPRECATED RDW RBC AUTO: 53.8 FL (ref 37–54)
EGFRCR SERPLBLD CKD-EPI 2021: 129 ML/MIN/1.73
EOSINOPHIL # BLD AUTO: 0.19 10*3/MM3 (ref 0–0.4)
EOSINOPHIL NFR BLD AUTO: 2.2 % (ref 0.3–6.2)
ERYTHROCYTE [DISTWIDTH] IN BLOOD BY AUTOMATED COUNT: 21 % (ref 12.3–15.4)
GLOBULIN UR ELPH-MCNC: 3.2 GM/DL
GLUCOSE SERPL-MCNC: 83 MG/DL (ref 65–99)
HCT VFR BLD AUTO: 34.5 % (ref 34–46.6)
HGB BLD-MCNC: 11.3 G/DL (ref 12–15.9)
HOLD SPECIMEN: NORMAL
IMM GRANULOCYTES # BLD AUTO: 0.05 10*3/MM3 (ref 0–0.05)
IMM GRANULOCYTES NFR BLD AUTO: 0.6 % (ref 0–0.5)
LYMPHOCYTES # BLD AUTO: 1.64 10*3/MM3 (ref 0.7–3.1)
LYMPHOCYTES NFR BLD AUTO: 19.3 % (ref 19.6–45.3)
MCH RBC QN AUTO: 23.8 PG (ref 26.6–33)
MCHC RBC AUTO-ENTMCNC: 32.8 G/DL (ref 31.5–35.7)
MCV RBC AUTO: 72.8 FL (ref 79–97)
MONOCYTES # BLD AUTO: 0.29 10*3/MM3 (ref 0.1–0.9)
MONOCYTES NFR BLD AUTO: 3.4 % (ref 5–12)
NEUTROPHILS NFR BLD AUTO: 6.29 10*3/MM3 (ref 1.7–7)
NEUTROPHILS NFR BLD AUTO: 74.3 % (ref 42.7–76)
NRBC BLD AUTO-RTO: 0 /100 WBC (ref 0–0.2)
PLATELET # BLD AUTO: 444 10*3/MM3 (ref 140–450)
PMV BLD AUTO: 8.5 FL (ref 6–12)
POTASSIUM SERPL-SCNC: 3.8 MMOL/L (ref 3.5–5.2)
PROT SERPL-MCNC: 6.5 G/DL (ref 6–8.5)
RBC # BLD AUTO: 4.74 10*6/MM3 (ref 3.77–5.28)
REF LAB TEST METHOD: NORMAL
SODIUM SERPL-SCNC: 141 MMOL/L (ref 136–145)
WBC NRBC COR # BLD: 8.48 10*3/MM3 (ref 3.4–10.8)
WHOLE BLOOD HOLD COAG: NORMAL
WHOLE BLOOD HOLD COAG: NORMAL
WHOLE BLOOD HOLD SPECIMEN: NORMAL

## 2023-09-25 PROCEDURE — 80053 COMPREHEN METABOLIC PANEL: CPT | Performed by: EMERGENCY MEDICINE

## 2023-09-25 PROCEDURE — 99213 OFFICE O/P EST LOW 20 MIN: CPT | Performed by: STUDENT IN AN ORGANIZED HEALTH CARE EDUCATION/TRAINING PROGRAM

## 2023-09-25 PROCEDURE — 99284 EMERGENCY DEPT VISIT MOD MDM: CPT

## 2023-09-25 PROCEDURE — 70450 CT HEAD/BRAIN W/O DYE: CPT

## 2023-09-25 PROCEDURE — 85025 COMPLETE CBC W/AUTO DIFF WBC: CPT | Performed by: EMERGENCY MEDICINE

## 2023-09-25 PROCEDURE — G0463 HOSPITAL OUTPT CLINIC VISIT: HCPCS

## 2023-09-25 RX ORDER — METOCLOPRAMIDE 10 MG/1
10 TABLET ORAL ONCE
Status: COMPLETED | OUTPATIENT
Start: 2023-09-25 | End: 2023-09-25

## 2023-09-25 RX ORDER — BUTALBITAL, ACETAMINOPHEN AND CAFFEINE 50; 325; 40 MG/1; MG/1; MG/1
1 TABLET ORAL ONCE
Status: COMPLETED | OUTPATIENT
Start: 2023-09-25 | End: 2023-09-25

## 2023-09-25 RX ORDER — MAGNESIUM OXIDE 400 MG/1
400 TABLET ORAL 2 TIMES DAILY PRN
Qty: 30 TABLET | Refills: 2 | Status: SHIPPED | OUTPATIENT
Start: 2023-09-25

## 2023-09-25 RX ORDER — BUTALBITAL, ACETAMINOPHEN AND CAFFEINE 50; 325; 40 MG/1; MG/1; MG/1
1 TABLET ORAL EVERY 6 HOURS PRN
Qty: 12 TABLET | Refills: 0 | Status: SHIPPED | OUTPATIENT
Start: 2023-09-25 | End: 2023-09-29

## 2023-09-25 RX ORDER — METOCLOPRAMIDE 10 MG/1
10 TABLET ORAL 3 TIMES DAILY PRN
Qty: 30 TABLET | Refills: 2 | Status: SHIPPED | OUTPATIENT
Start: 2023-09-25

## 2023-09-25 RX ADMIN — BUTALBITAL, ACETAMINOPHEN, AND CAFFEINE 1 TABLET: 50; 325; 40 TABLET ORAL at 16:05

## 2023-09-25 RX ADMIN — BUTALBITAL, ACETAMINOPHEN, AND CAFFEINE 1 TABLET: 50; 325; 40 TABLET ORAL at 20:46

## 2023-09-25 RX ADMIN — METOCLOPRAMIDE 10 MG: 10 TABLET ORAL at 16:05

## 2023-09-25 RX ADMIN — SODIUM CHLORIDE, POTASSIUM CHLORIDE, SODIUM LACTATE AND CALCIUM CHLORIDE 1000 ML: 600; 310; 30; 20 INJECTION, SOLUTION INTRAVENOUS at 10:12

## 2023-09-25 RX ADMIN — Medication 400 MG: at 16:05

## 2023-09-25 NOTE — ED NOTES
Patient's BP was elevated at time of discharge, MD notified; on call OB consulted and patient transferred to room 1 for further evaluation

## 2023-09-25 NOTE — PAYOR COMM NOTE
"[place]  [facility]  Case Management Extender  [phone] phone  [phone] fax    Carlyn Padgett (33 y.o. Female)       Date of Birth   1990    Social Security Number       Address   [address] [place] [address]    Home Phone   [phone]    MRN   [id number]       Baptism   Rastafarian    Marital Status                               Admission Date   23    Admission Type   Elective    Admitting Provider   Chelsea Walsh MD    Attending Provider       Department, Room/Bed   [facility] MOTHER [facility]AB[facility], M754/1       Discharge Date   2023    Discharge Disposition   Home or Self Care    Discharge Destination                                 Attending Provider: (none)   Allergies: Apple, Other    Isolation: None   Infection: None   Code Status: Prior    Ht: 161.3 cm (63.5\")   Wt: 107 kg (235 lb)    Admission Cmt: None   Principal Problem: Gestational diabetes mellitus (GDM) in third trimester controlled on oral hypoglycemic drug [O24.415]                   Active Insurance as of 2023       Primary Coverage       Payor Plan Insurance Group Employer/Plan Group    [employer] [place] [employer] [place]ICA[place]        Payor Plan Address Payor Plan Phone Number Payor Plan Fax Number Effective Dates    [address] [address] [phone]  2017 - None Entered    [facility] [id number]         Subscriber Name Subscriber Birth Date Member ID       CARLYN PADGETT 1990 [id number]                     Emergency Contacts        (Rel.) Home Phone Work Phone Mobile Phone    MingoJesús (Spouse) [phone] -- [phone]    GermainKaylen (Mother) [phone] -- --                 Discharge Summary        Chelsea Walsh MD at 23 0709          [facility]  Discharge Summary  Patient Name: Carlyn Padgett  : 1990  MRN: [id number]  CSN: " [id number]    Discharge Summary    Date of Admission: 2023   Date of Discharge: 2023    Principle Discharge Dx: Active Hospital Problems    Diagnosis  POA    **Gestational diabetes mellitus (GDM) in third trimester controlled on oral hypoglycemic drug [O24.415]  Yes     Based on BS log      Single liveborn infant delivered vaginally [Z38.00]  No    Supervision of high risk pregnancy in third trimester [O09.93]  Not Applicable    Grand multiparity [Z64.1]  Not Applicable    Unwanted fertility [Z30.09]  Yes     8/10- tubal papers signed.      Rubella non-immune status, antepartum [O09.899, Z28.39]  Not Applicable     Needs MMR PP      History of gestational diabetes in prior pregnancy, currently pregnant in third trimester [O09.293, Z86.32]  Not Applicable     Early glucola WNL      Obesity affecting pregnancy in third trimester [O99.213]  Yes    Herpes simplex virus type 2 (HSV-2) infection affecting pregnancy in third trimester [O98.513, B00.9]  Yes     Will need suppression at 36wks, does not want mentioned in front of anyone        Procedures Performed:    Brief History: Patient is a 33 y.o. now  who presented to labor and delivery at 38w0d for IOL secondary to GDMA-2.   Hospital Course: Patient presented at 38w0d for IOL.  She had a .  Her postpartum course was unremarkable.  On PPD #1 she expressed the desire for discharge.  She had passed gas and was urinating normally.  She was eating a regular diet without difficulty.  She was ambulating well.  Discharge instructions were given.  All questions were answered   Condition:  Discharge Activity: Stable  Activity Instructions       Bathing Restrictions      Type of Restriction: Bathing    Bathing Restrictions: Other    Explain Bathing Restrictions: No soaking in bathtub for 4 weeks. Showers are fine.    Driving Restrictions      Type of Restriction: Driving    Driving Restrictions: No Driving (Time Limited)    Length: Other    Indicate  Length of Restriction: No driving for 1 week or if using narcotic pain medications. Riding is car is fine.    Lifting Restrictions      Type of Restriction: Lifting    Lifting Restrictions: Other    Explain Lifing Restrictions: No lifting more than infant and baby carrier together for 6 weeks.    Pelvic Rest      Nothing in the vagina for 6 weeks to include tampons, intercourse, or douching.    Sexual Activity Restrictions      Type of Restriction: Sex    Explain Sexual Activity Restrictions: No sexual intercourse for at least 6 weeks           Discharge Diet: Diet Instructions       Diet: Regular/House Diet      Discharge Diet: Regular/House Diet    Texture: Regular Texture (IDDSI 7)    Fluid Consistency: Thin (IDDSI 0)           Discharge Medications:    Your medication list        START taking these medications        Instructions Last Dose Given Next Dose Due   acetaminophen 325 MG tablet  Commonly known as: TYLENOL      Take 2 tablets by mouth Every 6 (Six) Hours As Needed for Mild Pain.       Baclofen 5 MG tablet  Commonly known as: LIORESAL      Take 1 tablet by mouth 3 (Three) Times a Day.       ibuprofen 600 MG tablet  Commonly known as: ADVIL,MOTRIN      Take 1 tablet by mouth Every 6 (Six) Hours As Needed for Mild Pain. **Take with food**              CONTINUE taking these medications        Instructions Last Dose Given Next Dose Due   cetirizine 10 MG tablet  Commonly known as: zyrTEC      Take 1 tablet by mouth Daily.       docusate sodium 100 MG capsule  Commonly known as: Colace      Take 1 capsule by mouth 2 (Two) Times a Day.       ferrous sulfate 325 (65 FE) MG tablet      Take 1 tablet by mouth 2 (Two) Times a Day.       ondansetron 8 MG tablet  Commonly known as: Zofran      Take 1 tablet by mouth Every 8 (Eight) Hours As Needed for Nausea or Vomiting.                 Where to Get Your Medications        These medications were sent to [facility]  [address] [id number]      Hours: Monday to Friday 7 AM to 6 PM Phone: [phone]   acetaminophen 325 MG tablet  Baclofen 5 MG tablet  ibuprofen 600 MG tablet        Discharge Disposition: Home   Follow-up: No future appointments.  2 week PP visit (telephone)  6 week PP visit     <30 minutes was spent with the patient on the day of discharge.    This document has been electronically signed by Chelsea Walsh MD on 2023 12:48 CDT.    Electronically signed by Chelsea Walsh MD at 23 1248       Anabel Cueto MD at 23 1152          [facility]  Delivery Discharge Summary    Primary OB Clinician:     EDC: Estimated Date of Delivery: 10/5/23    Gestational Age:38w0d    Antepartum complications: gestational diabetes    Date of Delivery: 2023   Time of Delivery: 1:15 PM     Delivered By:  Chelsea Walsh     Delivery Type: Vaginal, Spontaneous      Tubal Ligation: Will plan outpatient    Baby:female  infant;   Apgar:  9  @ 1 minute /   Apgar:  9  @ 5 minutes   Weight: 3760 g (8 lb 4.6 oz)    Length: 21.25     Anesthesia: Epidural      Intrapartum complications: None    Laceration: No    Episiotomy: No    Placenta: Spontaneous     Feeding method: Breastfeeding Status: No    Rh Immune globulin given: not applicable    Rubella vaccine given: Declines    Discharge Date: 2023; Discharge Time: 11:53 CDT    Early Discharge:  NO    Plan:    Assessment:  PPD#2 - 34yo F, , s/p a  at 38 weeks gestation.  Pregnancy c/b GDMA2 - Metformin.  Postpartum c/b suspected Cervical radiculopathy.  Rh+/RubNI/GBSneg.  Meeting postpartum milestones and stable for discharge.      Plan:  - Pain: Ibuprofen 600mg Q6hrs prn + Percocet 5/325mg Q4hrs prn  - Cervical radiculopathy: Baclofen 5mg Q8hrs prn; Plan outpatient PT - referral placed (imaging will be forwarded)  - Contraception: Desires permanent sterilization (will plan outpatient)  - Diet: Regular  -  Activity: Shower as often as you like, but avoid tub baths or swimming until after your postpartum checkup. There should be nothing placed in the vagina until after your postpartum checkup. This means no tampons, douching or intercourse (sex).  - Disposition: Discharge to home with Follow-up in 6 weeks for routine postpartum care and contraceptive management        Address and phone number verified and same.  Follow-up appointment in 6 weeks.    Electronically signed by Cruz Cueto MD at 09/23/23 1203       Discharge Order (From admission, onward)       Start     Ordered    09/23/23 1159  Discharge patient  Once        Expected Discharge Date: 09/23/23   Discharge Disposition: Home or Self Care   Physician of Record for Attribution - Please select from Treatment Team: CRUZ CUETO [[id number]]   Review needed by CMO to determine Physician of Record: No   Please choose which facility the patient is currently admitted if they are being discharged to another facility or unit.: [facility]      Question Answer Comment   Physician of Record for Attribution - Please select from Treatment Team CRUZ CUETO    Review needed by CMO to determine Physician of Record No    Please choose which facility the patient is currently admitted if they are being discharged to another facility or unit. [facility]        09/23/23 1159    09/22/23 0709  Discharge patient  Once        Expected Discharge Date: 09/22/23   Discharge Disposition: Home or Self Care   Physician of Record for Attribution - Please select from Treatment Team: DEN BAHENA [[id number]]   Review needed by CMO to determine Physician of Record: No      Question Answer Comment   Physician of Record for Attribution - Please select from Treatment Team DEN BAHENA    Review needed by CMO to determine Physician of Record No        09/22/23 0709

## 2023-09-25 NOTE — DISCHARGE INSTRUCTIONS
Plenty of fluids to keep yourself well-hydrated.  Take caffeinated drinks and analgesics, follow-up with primary care/OB for reevaluation.  Return to ER for persistent headache or if having numbness tingling focal weakness etc.

## 2023-09-25 NOTE — PROGRESS NOTES
Triage Note    33-year-old -0-1-7 at postpartum day #4 status post spontaneous vaginal delivery.  Patient had induction of labor for GDM A2.  Had uncomplicated vaginal delivery.  On evening of postpartum day 0-1 had suspected anxiety attack, was also reporting pain in her neck and headache.  Patient never had any signs or symptoms consistent with preeclampsia during her admission per providers and ultimately had some improvement with her pain on baclofen and p.o. pain medication and was discharged home.  Today she presented to the ER stating that she has been having a persistent headache and neck pain that are not improving.  She states that they are very significant especially when she gets up to walk around to go to the bathroom or to care for her baby.  When laying flat she feels complete resolution of her symptoms.  She occasionally has some nausea without vomiting.  She will feel dizzy at times but has not had any other neurologic symptoms.  No vision changes, but some photophobia.  She was upset because she feels that she has not been able to interact much with her baby and even has difficulty sitting up to pump due to return of the headache.  She tried to Excedrin and a Coke last night.  She had minimal relief.  She does not like to take a lot of medications that she is working on pumping/feeding.      During her prior admission she had a CT of her cervical/thoracic/lumbar spine which were negative.  She had a CT head in the ER today which per radiology and ER providers as well as reported consensus from anesthesia, consistent with postdural puncture headache/spinal headache.    While the patient was in the ER she became frustrated and actually called our office.  She was asking if there is a way to get an order for an MRI at an outside facility that has an open MRI she had declined the MRI here due to concerns for feeling claustrophobic.  We have encouraged her to continue her care in the ER as they  were currently evaluating her.  I called the ER doctor and he noted that they suspected a spinal headache but due to blood pressure of 170s systolic they were continuing to monitor her.  I asked for him to watch several additional blood pressures and he called me back stating they were 150s to 160s so I asked that she be moved to labor and delivery for evaluation.    Vitals:    09/25/23 1553 09/25/23 1558 09/25/23 1603 09/25/23 1608   BP:   125/85    BP Location:       Patient Position:       Pulse: 71 77 74 82   Resp:       Temp:       TempSrc:       SpO2: 99% 99% 100% 100%   Weight:       Height:         Gen: well appearing; when lying down comfortable and interactive; mildly uncomfortable without acute distress when sitting  HEENT: equally reactive, squinting with light  CV: RRR  Chest: no increased work of breathing  Abd: soft, nontender    WBC   Date Value Ref Range Status   09/25/2023 8.48 3.40 - 10.80 10*3/mm3 Final     RBC   Date Value Ref Range Status   09/25/2023 4.74 3.77 - 5.28 10*6/mm3 Final     Hemoglobin   Date Value Ref Range Status   09/25/2023 11.3 (L) 12.0 - 15.9 g/dL Final     Hematocrit   Date Value Ref Range Status   09/25/2023 34.5 34.0 - 46.6 % Final     MCV   Date Value Ref Range Status   09/25/2023 72.8 (L) 79.0 - 97.0 fL Final     MCH   Date Value Ref Range Status   09/25/2023 23.8 (L) 26.6 - 33.0 pg Final     MCHC   Date Value Ref Range Status   09/25/2023 32.8 31.5 - 35.7 g/dL Final     RDW   Date Value Ref Range Status   09/25/2023 21.0 (H) 12.3 - 15.4 % Final     RDW-SD   Date Value Ref Range Status   09/25/2023 53.8 37.0 - 54.0 fl Final     MPV   Date Value Ref Range Status   09/25/2023 8.5 6.0 - 12.0 fL Final     Platelets   Date Value Ref Range Status   09/25/2023 444 140 - 450 10*3/mm3 Final     Neutrophil %   Date Value Ref Range Status   09/25/2023 74.3 42.7 - 76.0 % Final     Lymphocyte %   Date Value Ref Range Status   09/25/2023 19.3 (L) 19.6 - 45.3 % Final     Monocyte %    Date Value Ref Range Status   09/25/2023 3.4 (L) 5.0 - 12.0 % Final     Eosinophil %   Date Value Ref Range Status   09/25/2023 2.2 0.3 - 6.2 % Final     Basophil %   Date Value Ref Range Status   09/25/2023 0.2 0.0 - 1.5 % Final     Immature Grans %   Date Value Ref Range Status   09/25/2023 0.6 (H) 0.0 - 0.5 % Final     Neutrophils, Absolute   Date Value Ref Range Status   09/25/2023 6.29 1.70 - 7.00 10*3/mm3 Final     Lymphocytes, Absolute   Date Value Ref Range Status   09/25/2023 1.64 0.70 - 3.10 10*3/mm3 Final     Monocytes, Absolute   Date Value Ref Range Status   09/25/2023 0.29 0.10 - 0.90 10*3/mm3 Final     Eosinophils, Absolute   Date Value Ref Range Status   09/25/2023 0.19 0.00 - 0.40 10*3/mm3 Final     Basophils, Absolute   Date Value Ref Range Status   09/25/2023 0.02 0.00 - 0.20 10*3/mm3 Final     Immature Grans, Absolute   Date Value Ref Range Status   09/25/2023 0.05 0.00 - 0.05 10*3/mm3 Final     nRBC   Date Value Ref Range Status   09/25/2023 0.0 0.0 - 0.2 /100 WBC Final     Lab Results   Component Value Date    GLUCOSE 83 09/25/2023    BUN 7 09/25/2023    CREATININE 0.46 (L) 09/25/2023    EGFR 129.0 09/25/2023    BCR 15.2 09/25/2023    K 3.8 09/25/2023    CO2 26.0 09/25/2023    CALCIUM 9.3 09/25/2023    ALBUMIN 3.3 (L) 09/25/2023    BILITOT 0.4 09/25/2023    AST 16 09/25/2023    ALT 13 09/25/2023     CT Head Without Contrast    Result Date: 9/25/2023  CT HEAD WO CONTRAST HISTORY: HEADACHE COMPARISON: None Automated exposure control was also utilized to decrease patient radiation dose. TECHNIQUE: Helical tomographic images of the brain were obtained without the use of intravenous contrast. FINDINGS: The paranasal sinuses, mastoid air cells, and inner ears are generally clear. Intraorbital contents are unremarkable. Calvarium demonstrates no gross abnormality. The ventricular and cisternal spaces are small.  While this may be normal for the patient, on the sagittal reconstructions the does  appear to be mild sagging of the brainstem.  These findings raise the possibility of intracranial hypotension. No mass effect, intracranial hemorrhage, or extra-axial fluid collections.   1.  The ventricular and cisternal spaces are small and on the sagittal reconstructions there are findings suggestive of brainstem sagging.  This raise the possibility of intracranial hypotension.  Further evaluation with an MRI of the brain is recommended.     CT Cervical Spine Without Contrast  Result Date: 9/23/2023  INDICATION: Neck pain, acute, no red flagsMRI of Head, neck , and spinal column.. COMPARISON: None relevant. TECHNIQUE: Axial images were performed through the cervical spine followed by 2D multiplanar reformats.  No contrast was administered. FINDINGS: Curvature and alignment of the cervical spine are normal.  Vertebral body heights are maintained.  There is no prevertebral soft tissue swelling.  The atlantoaxial relationship is normal.  There is no fracture or traumatic subluxation.   No acute abnormality of the cervical spine.     CT Thoracic Spine Without Contrast  Result Date: 9/23/2023  INDICATION: Dizziness, non-specificHead, necka nd spine.. COMPARISON: None relevant. TECHNIQUE: Axial images were performed through the thoracic spine followed by 2D multiplanar reformats.  No contrast was administered. FINDINGS: Curvature and alignment of the thoracic spine are normal.  Vertebral body heights are maintained.  There is no paravertebral soft tissue swelling.  There is no fracture or traumatic subluxation.   No acute abnormality of the thoracic spine.     CT Lumbar Spine Without Contrast  Result Date: 9/23/2023  INDICATION: Dizziness, non-specificHead, necka nd spine.. COMPARISON: None relevant. TECHNIQUE: Helical CT of the lumbar spine was performed without intravenous contrast. Multiplanar reformations were provided. FINDINGS: Vertebral body heights, alignment, and interbody spaces are normal.  No acute  fracture.   Within normal limits.     33-year-old -0-1-7 at postpartum day #4 status post spontaneous vaginal delivery with persistent headache consistent with a spinal headache.  -During postpartum admission, patient had suspected musculoskeletal pain as primarily presented with pain in her shoulders and neck and was treated with baclofen and oral pain medications with some improvement  -Reports since discharge significant and persistent headache that is very positional, resolves when lying flat and significant when vertical to ambulate or sit up  -Some photophobia and occasional dizziness when standing, no other neurologic symptoms and ambulating otherwise normally; some nausea without vomiting on occasion  -Patient had tried Excedrin x2 and soda last evening; had not tried any medications today, declined morphine in the ER  -Trialed a cocktail of oral mag oxide 400 mg, Reglan 10 mg, and Fioricet x1 with significant improvement when ambulating.  Patient stated that the headache was not resolved but she at least could sit up straight and was excited to be able to potentially hold her baby  -While patient was in the ER anesthesia had been wary to place a blood patch due to patient's prior anxiety surrounding spinal and possible history of spinal issues  -Due to persistent headache that is consistent with a spinal headache, anesthesia will come to evaluate patient discussed with her the options of continuing with this trial of oral medications versus considering a blood patch; we discussed that spinal headaches typically resolve within a week but can take even a couple of weeks for complete resolution  -While there were concern for strange blood pressures initially in the ER, all patient's blood pressures on her floor have been within normal limits and labs are within normal limits with a positional headache that is not consistent with a normal preeclampsia headache; with no concerns for preeclampsia with severe  features at this time will need to continue to monitor closely  -Suspect will be able to discharge home pending pain with anesthesia        This document has been electronically signed by Daphne Hand DO on September 25, 2023 18:56 CDT

## 2023-09-25 NOTE — ED PROVIDER NOTES
Subjective   History of Present Illness  33 years old female with history of anxiety presented in the ER with chief complaint of generalized headache for the last 4 days after she had epidural for vaginal delivery.  Patient reports dull aching to sharp headache all over without any associated nausea vomiting, numbness tingling focal weakness, difficulty speech or visual disturbance.  Patient has been taking caffeine drink and Excedrin Migraine since yesterday with no significant relief.  Patient is here for possible blood patch.  She was having back pain symptoms and had CT cervical spine, thoracic and lumbar spines done which were negative for any acute findings.  Denies any fever or chills.    History provided by:  Patient    Review of Systems   Constitutional:  Negative for chills and fever.   HENT:  Negative for congestion, ear pain and sinus pain.    Respiratory:  Negative for chest tightness and shortness of breath.    Cardiovascular:  Negative for chest pain and palpitations.   Gastrointestinal:  Negative for abdominal pain, nausea and vomiting.   Genitourinary:  Negative for flank pain.   Musculoskeletal:  Positive for back pain.   Skin:  Negative for color change.   Neurological:  Positive for headaches.     Past Medical History:   Diagnosis Date    Anxiety     Asthma     Gestational diabetes     Herpes     Migraine        Allergies   Allergen Reactions    Apple Anaphylaxis     Apples in foods    Other Anaphylaxis     APPLES       Past Surgical History:   Procedure Laterality Date    WISDOM TOOTH EXTRACTION         Family History   Problem Relation Age of Onset    No Known Problems Mother     Cirrhosis Father     No Known Problems Brother     No Known Problems Son     No Known Problems Son     No Known Problems Son     No Known Problems Son     No Known Problems Son     No Known Problems Maternal Grandmother     No Known Problems Maternal Grandfather     Alzheimer's disease Paternal Grandmother     Breast  cancer Other        Social History     Socioeconomic History    Marital status:    Tobacco Use    Smoking status: Never    Smokeless tobacco: Never   Vaping Use    Vaping Use: Never used   Substance and Sexual Activity    Alcohol use: No    Drug use: No    Sexual activity: Yes     Partners: Male     Comment: LAST PAP NORMAL 7/17/20           Objective   Physical Exam  Vitals and nursing note reviewed.   Constitutional:       Appearance: Normal appearance.   HENT:      Head: Normocephalic.      Right Ear: Tympanic membrane, ear canal and external ear normal.      Left Ear: Tympanic membrane, ear canal and external ear normal.      Nose: Nose normal.      Mouth/Throat:      Mouth: Mucous membranes are moist.   Eyes:      Extraocular Movements: Extraocular movements intact.      Conjunctiva/sclera: Conjunctivae normal.      Pupils: Pupils are equal, round, and reactive to light.   Cardiovascular:      Rate and Rhythm: Normal rate and regular rhythm.   Pulmonary:      Effort: Pulmonary effort is normal.      Breath sounds: Normal breath sounds.   Abdominal:      General: Abdomen is flat. Bowel sounds are normal.      Palpations: Abdomen is soft.      Tenderness: There is no abdominal tenderness.   Musculoskeletal:         General: Normal range of motion.      Cervical back: Normal range of motion and neck supple. No rigidity.   Skin:     General: Skin is warm.      Capillary Refill: Capillary refill takes less than 2 seconds.   Neurological:      General: No focal deficit present.      Mental Status: She is alert and oriented to person, place, and time. Mental status is at baseline.      Cranial Nerves: No cranial nerve deficit.      Sensory: No sensory deficit.      Motor: No weakness.      Coordination: Coordination normal.      Deep Tendon Reflexes: Reflexes normal.   Psychiatric:         Mood and Affect: Mood normal.       Procedures           ED Course                                           Medical  Decision Making  33-year-old is evaluated for post epidural headache for the last 4 days.  She has been trying conservative measures with no significant relief.  I have discussed with Dr. Stone, recommended continue with caffeinated drinks and Excedrin Migraine along with increased hydration and lying flat for couple more days before we will plan for blood patch.  He recommended obtaining CT to rule out any hemorrhage.  I have obtained CT head which is negative for intracranial bleed but does show finding consistent with intracranial hypotension suggesting MRI which patient absolutely refused.  I believe her CT findings are consistent with spinal headache.  I have discussed with Dr. Stone again and still he recommended the same conservative measures.  I have discussed with patient in detail about current treatment and signs symptoms of worsening needing return to ER which she seems understanding.     At the time of discharge patient blood pressure spiked to 170s.  Discussed with Dr. Hand OB/GYN, patient is observed in the ER for an hour and still having a blood pressure of 153/88.  Patient does fall in the category of preeclampsia.  I have discussed with Dr. Hand again and patient would be sent to L&D for triage and further evaluation.    Problems Addressed:  Postpartum hypertension: complicated acute illness or injury  Spinal headache: complicated acute illness or injury    Amount and/or Complexity of Data Reviewed  Labs: ordered.  Radiology: ordered.      Labs Reviewed   COMPREHENSIVE METABOLIC PANEL - Abnormal; Notable for the following components:       Result Value    Creatinine 0.46 (*)     Albumin 3.3 (*)     Alkaline Phosphatase 247 (*)     All other components within normal limits    Narrative:     GFR Normal >60  Chronic Kidney Disease <60  Kidney Failure <15     CBC WITH AUTO DIFFERENTIAL - Abnormal; Notable for the following components:    Hemoglobin 11.3 (*)     MCV 72.8 (*)     MCH 23.8 (*)      RDW 21.0 (*)     Lymphocyte % 19.3 (*)     Monocyte % 3.4 (*)     Immature Grans % 0.6 (*)     All other components within normal limits   RAINBOW DRAW    Narrative:     The following orders were created for panel order [place] Draw.  Procedure                               Abnormality         Status                     ---------                               -----------         ------                     Green Top (Gel)[[id number]]                                  Final result               Lavender Top[[id number]]                                     Final result               Gold Top - SST[[id number]]                                   Final result               Light Blue Top[[id number]]                                   Final result                 Please view results for these tests on the individual orders.   GREEN TOP   LAVENDER TOP   GOLD TOP - SST   LIGHT BLUE TOP   CBC AND DIFFERENTIAL    Narrative:     The following orders were created for panel order CBC & Differential.  Procedure                               Abnormality         Status                     ---------                               -----------         ------                     CBC Auto Differential[[id number]]        Abnormal            Final result               Scan Slide[[id number]]                                                                    Please view results for these tests on the individual orders.   EXTRA TUBES    Narrative:     The following orders were created for panel order Extra Tubes.  Procedure                               Abnormality         Status                     ---------                               -----------         ------                     Gold Top - SST[[id number]]                                   Final result               Light Blue Top[[id number]]                                   Final result                 Please view results for these tests on the individual orders.   GOLD TOP - SST   LIGHT BLUE TOP        CT Head Without Contrast    Result Date: 9/25/2023  Narrative: CT HEAD WO CONTRAST HISTORY: HEADACHE COMPARISON: None Automated exposure control was also utilized to decrease patient radiation dose. TECHNIQUE: Helical tomographic images of the brain were obtained without the use of intravenous contrast. FINDINGS: The paranasal sinuses, mastoid air cells, and inner ears are generally clear. Intraorbital contents are unremarkable. Calvarium demonstrates no gross abnormality. The ventricular and cisternal spaces are small.  While this may be normal for the patient, on the sagittal reconstructions the does appear to be mild sagging of the brainstem.  These findings raise the possibility of intracranial hypotension. No mass effect, intracranial hemorrhage, or extra-axial fluid collections.     Impression: 1.  The ventricular and cisternal spaces are small and on the sagittal reconstructions there are findings suggestive of brainstem sagging.  This raise the possibility of intracranial hypotension.  Further evaluation with an MRI of the brain is recommended.     CT Cervical Spine Without Contrast    Result Date: 9/23/2023  Narrative: INDICATION: Neck pain, acute, no red flagsMRI of Head, neck , and spinal column.. COMPARISON: None relevant. TECHNIQUE: Axial images were performed through the cervical spine followed by 2D multiplanar reformats.  No contrast was administered. FINDINGS: Curvature and alignment of the cervical spine are normal.  Vertebral body heights are maintained.  There is no prevertebral soft tissue swelling.  The atlantoaxial relationship is normal.  There is no fracture or traumatic subluxation.     Impression: No acute abnormality of the cervical spine.     CT Thoracic Spine Without Contrast    Result Date: 9/23/2023  Narrative: INDICATION: Dizziness, non-specificHead, necka nd spine.. COMPARISON: None relevant. TECHNIQUE: Axial images were performed through the thoracic spine followed by 2D  multiplanar reformats.  No contrast was administered. FINDINGS: Curvature and alignment of the thoracic spine are normal.  Vertebral body heights are maintained.  There is no paravertebral soft tissue swelling.  There is no fracture or traumatic subluxation.     Impression: No acute abnormality of the thoracic spine.     CT Lumbar Spine Without Contrast    Result Date: 9/23/2023  Narrative: INDICATION: Dizziness, non-specificHead, necka nd spine.. COMPARISON: None relevant. TECHNIQUE: Helical CT of the lumbar spine was performed without intravenous contrast. Multiplanar reformations were provided. FINDINGS: Vertebral body heights, alignment, and interbody spaces are normal.  No acute fracture.     Impression: Within normal limits.     US Fetal Biophysical Profile;Without Non-Stress Testing    Result Date: 9/14/2023  Narrative: INDICATION: Pregnancy. COMPARISON: None relevant. TECHNIQUE: Targeted high-resolution grayscale and color Doppler with M-mode analysis was obtained of the pelvis via transpelvic approach.     Impression: FINDINGS/IMPRESSION: Living intrauterine stahl is currently in cephalic presentation with fetal heart rate of 1:30 bpm.  Placenta is anterior right.  Amniotic fluid index is 17.3 cm with a maximum vertical pocket of 8.1 cm. Fetal tone - 2 Fetal breathing - 2 Fetal movement - 2 Amniotic fluid - 2 Total - 8/8     US Fetal Biophysical Profile;Without Non-Stress Testing, US ob follow up transabdominal approach    Result Date: 9/7/2023  Narrative: INDICATION: Pregnancy. COMPARISON: None relevant. TECHNIQUE: High-resolution grayscale and color Doppler with M-mode analysis ultrasound was obtained of the targeted pelvis via transpelvic approach. FINDINGS: Living intrauterine stahl is currently in cephalic presentation and demonstrates a fetal heart rate of 137 bpm.  Anterior placenta.  Amniotic fluid index is 19.4 cm maximum vertical pocket of 8.1 cm. Average ultrasound age - 37 weeks 5 days  Biparietal diameter - 9.30 cm - 37 weeks 6 days Head circumference - 33.28 cm - 38 weeks 0 days Abdominal circumference - 33.82 cm - 37 weeks 5 days Femur length - 7.22 cm - 37 weeks 0 days Estimated fetal weight - 3253 g (7 lbs. 3 oz.), 89th percentile Biophysical profile: Fetal tone - 2 Fetal breathing - 2 Fetal movement - 2 Amniotic fluid - 2 Total - 8/8     Impression: Living intrauterine espinoza with estimated gestational age of 37 weeks 5 days. BPP 8/8     US Fetal Biophysical Profile;Without Non-Stress Testing    Result Date: 8/31/2023  Narrative: EXAM: Ultrasound fetal biophysical profile COMPARISON: Obstetric sonogram 08/24/2023 HISTORY: Biophysical profile for gestational diabetes mellitus. TECHNIQUE: Real-time grayscale imaging is performed of the uterus and fetus for fetal biophysical profile assessment. FINDINGS: Single fetus with cardiac activity and fetal heart rate of 135 bpm.  Fetal presentation is cephalic.  Placenta is anterior.  Amniotic fluid index is normal, measuring 21.58 cm.  MVP is 7.09 cm. Fetal stomach, kidneys and bladder demonstrate no discrete abnormality. Fetal breathing score is 2 Fetal fluids score is 2 Fetal tone score is 2 Fetal movement score is 2 IMPRESSION 1.  Espinoza live intrauterine gestation with cephalic presentation. 2.  Fetal biophysical profile score 8 out of 8.          Final diagnoses:   Spinal headache   Postpartum hypertension       ED Disposition  ED Disposition       ED Disposition   Send to L&D    Condition   --    Comment   Admitting Physician: ELSIE BLEDSOE [[id number]]   Attending Physician: ELSIE BLEDSOE [[id number]]   Unit: [facility] LABOR DELIVERY [[id number]]                 Anabel uCeto MD  [address] [place] [address] [phone]    Call in 1 day  for re evaluation    [facility] EMERGENCY DEPARTMENT  [address] [place] [id number] [phone]    As needed, If symptoms worsen         Medication  List      No changes were made to your prescriptions during this visit.            Arturo Hernandez MD  09/25/23 0600

## 2023-09-25 NOTE — ED NOTES
Nursing report ED to floor  Carlyn Aguila  33 y.o.  female    HPI:   Chief Complaint   Patient presents with    Back Pain    Headache    Neck Pain       Admitting doctor:   Daphne Hand DO    Consulting provider(s):  Consults       No orders found from 8/27/2023 to 9/26/2023.             Admitting diagnosis:   The primary encounter diagnosis was Spinal headache. A diagnosis of Postpartum hypertension was also pertinent to this visit.    Code status:   Current Code Status       Date Active Code Status Order ID Comments User Context       Prior            Allergies:   Apple and Other    Intake and Output    Intake/Output Summary (Last 24 hours) at 9/25/2023 1408  Last data filed at 9/25/2023 1320  Gross per 24 hour   Intake 1000 ml   Output --   Net 1000 ml       Weight:       09/25/23  1120   Weight: 99.8 kg (220 lb)       Most recent vitals:   Vitals:    09/25/23 1120 09/25/23 1217 09/25/23 1249 09/25/23 1315   BP:  (!) 131/101 178/98 127/78   BP Location:  Left arm     Patient Position:  Lying     Pulse:  79  72   Resp:  18  17   Temp:       TempSrc:       SpO2:  100%  98%   Weight: 99.8 kg (220 lb)      Height:         Oxygen Therapy: NA     Active LDAs/IV Access:   Lines, Drains & Airways       Active LDAs       Name Placement date Placement time Site Days    Peripheral IV 09/25/23 0915 Right Antecubital 09/25/23 0915  Antecubital  less than 1                    Labs (abnormal labs have a star):   Labs Reviewed   COMPREHENSIVE METABOLIC PANEL - Abnormal; Notable for the following components:       Result Value    Creatinine 0.46 (*)     Albumin 3.3 (*)     Alkaline Phosphatase 247 (*)     All other components within normal limits    Narrative:     GFR Normal >60  Chronic Kidney Disease <60  Kidney Failure <15     CBC WITH AUTO DIFFERENTIAL - Abnormal; Notable for the following components:    Hemoglobin 11.3 (*)     MCV 72.8 (*)     MCH 23.8 (*)     RDW 21.0 (*)     Lymphocyte % 19.3 (*)     Monocyte %  3.4 (*)     Immature Grans % 0.6 (*)     All other components within normal limits   RAINBOW DRAW    Narrative:     The following orders were created for panel order [place] Draw.  Procedure                               Abnormality         Status                     ---------                               -----------         ------                     Green Top (Gel)[[id number]]                                  Final result               Lavender Top[[id number]]                                     Final result               Gold Top - SST[[id number]]                                   Final result               Light Blue Top[[id number]]                                   Final result                 Please view results for these tests on the individual orders.   GREEN TOP   LAVENDER TOP   GOLD TOP - SST   LIGHT BLUE TOP   CBC AND DIFFERENTIAL    Narrative:     The following orders were created for panel order CBC & Differential.  Procedure                               Abnormality         Status                     ---------                               -----------         ------                     CBC Auto Differential[[id number]]        Abnormal            Final result               Scan Slide[[id number]]                                                                    Please view results for these tests on the individual orders.   EXTRA TUBES    Narrative:     The following orders were created for panel order Extra Tubes.  Procedure                               Abnormality         Status                     ---------                               -----------         ------                     Gold Top - SST[[id number]]                                   In process                 Light Blue Top[[id number]]                                   In process                   Please view results for these tests on the individual orders.   GOLD TOP - SST   LIGHT BLUE TOP       Meds given in ED:   Medications   lactated  ringers bolus 1,000 mL (0 mL Intravenous Stopped 9/25/23 1320)     No current facility-administered medications for this encounter.       NIH Stroke Scale:       Isolation/Infection(s):  No active isolations   No active infections     COVID Testing  Collected No  Resulted NA     Nursing report ED to floor:  Mental status: AOx4  Ambulatory status: Self   Precautions: None     ED nurse phone extentsion- [phone]

## 2023-09-26 NOTE — PLAN OF CARE
Problem: Adult Inpatient Plan of Care  Goal: Plan of Care Review  Outcome: Met  Flowsheets (Taken 2023)  Plan of Care Reviewed With:   spouse   patient  Outcome Evaluation: pain improved, doctor approved for discharge home  Goal: Patient-Specific Goal (Individualized)  Outcome: Met  Goal: Absence of Hospital-Acquired Illness or Injury  Outcome: Met  Goal: Optimal Comfort and Wellbeing  Outcome: Met  Goal: Readiness for Transition of Care  Outcome: Met  Intervention: Mutually Develop Transition Plan  Recent Flowsheet Documentation  Taken 2023 by Sabina Lamar RN  Equipment Needed After Discharge: none  Equipment Currently Used at Home: none  Anticipated Changes Related to Illness: none  Transportation Anticipated: family or friend will provide  Transportation Concerns: none  Concerns to be Addressed: no discharge needs identified  Readmission Within the Last 30 Days: (last last admitted for a birth of baby within the last week) other (see comments)  Patient/Family Anticipated Services at Transition: none  Patient/Family Anticipates Transition to: home with family     Problem:  Fall Injury Risk  Goal: Absence of Fall, Infant Drop and Related Injury  Outcome: Met   Goal Outcome Evaluation:  Plan of Care Reviewed With: spouse, patient           Outcome Evaluation: pain improved, doctor approved for discharge home

## 2023-09-27 ENCOUNTER — TELEPHONE (OUTPATIENT)
Dept: OBSTETRICS AND GYNECOLOGY | Facility: CLINIC | Age: 33
End: 2023-09-27
Payer: COMMERCIAL

## 2023-09-27 LAB
QT INTERVAL: 346 MS
QTC INTERVAL: 441 MS

## 2023-09-27 NOTE — TELEPHONE ENCOUNTER
PATIENT CALLED AND IS NEEDING A CALL BACK IN REGARDS TO SOME MEDICATION THAT SHE WAS GIVING IN THE HOSPITAL DURING HER PREGNANCY AND SHE HAS SOME QUESTIONS REGARDING THESE. HER NUMBER TO CALL BACK [phone]. THAT IS HER MOTHERS NUMBER IN CASE YOU CAN'T GET A HOLD OF HER -[phone].          CALLED AND SPOKE WITH THE PT.  SHE JUST WANTED TO MAKE SURE THAT IT WAS OK TO TAKE ALL OF THE MEDICATIONS THAT SHE WAS GIVEN WHEN SHE WAS AT THE HOSPITAL - TOGETHER.  I TOLD HER THAT IT IS OK, BUT IF IT IS A LOT ON HER BELLY SHE COULD SPACE THEM OUR OR TAKE WITH FOOD.  PT VERBALIZED UNDERSTANDING.

## 2023-09-27 NOTE — TELEPHONE ENCOUNTER
PATIENT CALLED AND IS NEEDING A CALL BACK IN REGARDS TO SOME MEDICATION THAT SHE WAS GIVING IN THE HOSPITAL DURING HER PREGNANCY AND SHE HAS SOME QUESTIONS REGARDING THESE. HER NUMBER TO CALL BACK [phone]. THAT IS HER MOTHERS NUMBER IN CASE YOU CAN'T GET A HOLD OF HER [phone].          THANKS,        LUIZA

## 2023-09-29 DIAGNOSIS — G97.1 SPINAL HEADACHE: Primary | ICD-10-CM

## 2023-09-29 RX ORDER — BUTALBITAL, ACETAMINOPHEN AND CAFFEINE 50; 325; 40 MG/1; MG/1; MG/1
1 TABLET ORAL EVERY 6 HOURS PRN
Qty: 4 TABLET | Refills: 0 | Status: SHIPPED | OUTPATIENT
Start: 2023-09-29